# Patient Record
Sex: FEMALE | Race: OTHER | NOT HISPANIC OR LATINO
[De-identification: names, ages, dates, MRNs, and addresses within clinical notes are randomized per-mention and may not be internally consistent; named-entity substitution may affect disease eponyms.]

---

## 2021-05-10 ENCOUNTER — NON-APPOINTMENT (OUTPATIENT)
Age: 71
End: 2021-05-10

## 2021-05-10 ENCOUNTER — APPOINTMENT (OUTPATIENT)
Dept: OPHTHALMOLOGY | Facility: CLINIC | Age: 71
End: 2021-05-10
Payer: MEDICAID

## 2021-05-10 PROCEDURE — 92020 GONIOSCOPY: CPT

## 2021-05-10 PROCEDURE — 92250 FUNDUS PHOTOGRAPHY W/I&R: CPT

## 2021-05-10 PROCEDURE — 92136 OPHTHALMIC BIOMETRY: CPT

## 2021-05-10 PROCEDURE — 92004 COMPRE OPH EXAM NEW PT 1/>: CPT

## 2021-05-10 PROCEDURE — 99072 ADDL SUPL MATRL&STAF TM PHE: CPT

## 2021-06-10 ENCOUNTER — NON-APPOINTMENT (OUTPATIENT)
Age: 71
End: 2021-06-10

## 2021-06-10 ENCOUNTER — APPOINTMENT (OUTPATIENT)
Dept: OPHTHALMOLOGY | Facility: CLINIC | Age: 71
End: 2021-06-10
Payer: MEDICAID

## 2021-06-10 PROBLEM — Z00.00 ENCOUNTER FOR PREVENTIVE HEALTH EXAMINATION: Status: ACTIVE | Noted: 2021-06-10

## 2021-06-10 PROCEDURE — 76511 OPH US DX QUAN A-SCAN ONLY: CPT | Mod: LT

## 2021-06-10 PROCEDURE — 92012 INTRM OPH EXAM EST PATIENT: CPT

## 2021-06-25 DIAGNOSIS — Z01.818 ENCOUNTER FOR OTHER PREPROCEDURAL EXAMINATION: ICD-10-CM

## 2021-06-26 ENCOUNTER — APPOINTMENT (OUTPATIENT)
Dept: DISASTER EMERGENCY | Facility: CLINIC | Age: 71
End: 2021-06-26

## 2021-06-28 ENCOUNTER — TRANSCRIPTION ENCOUNTER (OUTPATIENT)
Age: 71
End: 2021-06-28

## 2021-06-29 ENCOUNTER — APPOINTMENT (OUTPATIENT)
Dept: OPHTHALMOLOGY | Facility: AMBULATORY SURGERY CENTER | Age: 71
End: 2021-06-29

## 2021-06-29 ENCOUNTER — OUTPATIENT (OUTPATIENT)
Dept: OUTPATIENT SERVICES | Facility: HOSPITAL | Age: 71
LOS: 1 days | Discharge: HOME | End: 2021-06-29
Payer: MEDICAID

## 2021-06-29 ENCOUNTER — NON-APPOINTMENT (OUTPATIENT)
Age: 71
End: 2021-06-29

## 2021-06-29 LAB
GLUCOSE BLDC GLUCOMTR-MCNC: 255 MG/DL — HIGH (ref 70–99)
GLUCOSE BLDC GLUCOMTR-MCNC: 301 MG/DL — HIGH (ref 70–99)
GLUCOSE BLDC GLUCOMTR-MCNC: 311 MG/DL — HIGH (ref 70–99)
GLUCOSE BLDC GLUCOMTR-MCNC: 320 MG/DL — HIGH (ref 70–99)

## 2021-06-29 PROCEDURE — 66850 REMOVAL OF LENS MATERIAL: CPT | Mod: RT

## 2021-06-30 ENCOUNTER — NON-APPOINTMENT (OUTPATIENT)
Age: 71
End: 2021-06-30

## 2021-06-30 ENCOUNTER — APPOINTMENT (OUTPATIENT)
Dept: OPHTHALMOLOGY | Facility: CLINIC | Age: 71
End: 2021-06-30
Payer: MEDICAID

## 2021-06-30 PROCEDURE — 99024 POSTOP FOLLOW-UP VISIT: CPT

## 2021-07-01 ENCOUNTER — APPOINTMENT (OUTPATIENT)
Dept: OPHTHALMOLOGY | Facility: CLINIC | Age: 71
End: 2021-07-01

## 2021-07-06 ENCOUNTER — APPOINTMENT (OUTPATIENT)
Dept: OPHTHALMOLOGY | Facility: CLINIC | Age: 71
End: 2021-07-06
Payer: MEDICAID

## 2021-07-06 ENCOUNTER — NON-APPOINTMENT (OUTPATIENT)
Age: 71
End: 2021-07-06

## 2021-07-06 PROCEDURE — 92134 CPTRZ OPH DX IMG PST SGM RTA: CPT

## 2021-07-06 PROCEDURE — 99024 POSTOP FOLLOW-UP VISIT: CPT

## 2021-07-06 PROCEDURE — 92136 OPHTHALMIC BIOMETRY: CPT | Mod: 26,RT

## 2021-07-07 ENCOUNTER — APPOINTMENT (OUTPATIENT)
Dept: OPHTHALMOLOGY | Facility: CLINIC | Age: 71
End: 2021-07-07
Payer: MEDICAID

## 2021-07-07 ENCOUNTER — NON-APPOINTMENT (OUTPATIENT)
Age: 71
End: 2021-07-07

## 2021-07-07 ENCOUNTER — TRANSCRIPTION ENCOUNTER (OUTPATIENT)
Age: 71
End: 2021-07-07

## 2021-07-07 PROCEDURE — 99024 POSTOP FOLLOW-UP VISIT: CPT

## 2021-07-08 ENCOUNTER — APPOINTMENT (OUTPATIENT)
Dept: OPHTHALMOLOGY | Facility: AMBULATORY SURGERY CENTER | Age: 71
End: 2021-07-08

## 2021-07-08 ENCOUNTER — OUTPATIENT (OUTPATIENT)
Dept: OUTPATIENT SERVICES | Facility: HOSPITAL | Age: 71
LOS: 1 days | Discharge: HOME | End: 2021-07-08
Payer: MEDICAID

## 2021-07-08 LAB
GLUCOSE BLDC GLUCOMTR-MCNC: 129 MG/DL — HIGH (ref 70–99)
GLUCOSE BLDC GLUCOMTR-MCNC: 161 MG/DL — HIGH (ref 70–99)

## 2021-07-08 PROCEDURE — 66985 INSERT LENS PROSTHESIS: CPT | Mod: RT,79

## 2021-07-08 PROCEDURE — 66850 REMOVAL OF LENS MATERIAL: CPT | Mod: 79,RT

## 2021-07-09 ENCOUNTER — NON-APPOINTMENT (OUTPATIENT)
Age: 71
End: 2021-07-09

## 2021-07-09 ENCOUNTER — APPOINTMENT (OUTPATIENT)
Dept: OPHTHALMOLOGY | Facility: CLINIC | Age: 71
End: 2021-07-09
Payer: MEDICAID

## 2021-07-09 PROCEDURE — 99024 POSTOP FOLLOW-UP VISIT: CPT

## 2021-07-16 ENCOUNTER — APPOINTMENT (OUTPATIENT)
Dept: OPHTHALMOLOGY | Facility: CLINIC | Age: 71
End: 2021-07-16
Payer: MEDICAID

## 2021-07-16 ENCOUNTER — NON-APPOINTMENT (OUTPATIENT)
Age: 71
End: 2021-07-16

## 2021-07-16 PROCEDURE — 99024 POSTOP FOLLOW-UP VISIT: CPT

## 2021-07-29 ENCOUNTER — APPOINTMENT (OUTPATIENT)
Dept: OPHTHALMOLOGY | Facility: CLINIC | Age: 71
End: 2021-07-29

## 2021-07-30 ENCOUNTER — NON-APPOINTMENT (OUTPATIENT)
Age: 71
End: 2021-07-30

## 2021-07-30 ENCOUNTER — APPOINTMENT (OUTPATIENT)
Dept: OPHTHALMOLOGY | Facility: CLINIC | Age: 71
End: 2021-07-30
Payer: MEDICAID

## 2021-07-30 PROCEDURE — 99024 POSTOP FOLLOW-UP VISIT: CPT

## 2021-08-27 ENCOUNTER — APPOINTMENT (OUTPATIENT)
Dept: OPHTHALMOLOGY | Facility: CLINIC | Age: 71
End: 2021-08-27
Payer: MEDICAID

## 2021-08-27 ENCOUNTER — NON-APPOINTMENT (OUTPATIENT)
Age: 71
End: 2021-08-27

## 2021-08-27 PROCEDURE — 99024 POSTOP FOLLOW-UP VISIT: CPT

## 2021-09-17 ENCOUNTER — APPOINTMENT (OUTPATIENT)
Dept: OPHTHALMOLOGY | Facility: CLINIC | Age: 71
End: 2021-09-17
Payer: MEDICAID

## 2021-09-17 ENCOUNTER — NON-APPOINTMENT (OUTPATIENT)
Age: 71
End: 2021-09-17

## 2021-09-17 PROCEDURE — 92134 CPTRZ OPH DX IMG PST SGM RTA: CPT

## 2021-09-17 PROCEDURE — 92014 COMPRE OPH EXAM EST PT 1/>: CPT | Mod: 24

## 2021-10-08 ENCOUNTER — APPOINTMENT (OUTPATIENT)
Dept: OPHTHALMOLOGY | Facility: CLINIC | Age: 71
End: 2021-10-08
Payer: MEDICAID

## 2021-10-08 ENCOUNTER — NON-APPOINTMENT (OUTPATIENT)
Age: 71
End: 2021-10-08

## 2021-10-08 PROCEDURE — 92014 COMPRE OPH EXAM EST PT 1/>: CPT

## 2021-10-08 PROCEDURE — 92134 CPTRZ OPH DX IMG PST SGM RTA: CPT

## 2021-12-27 ENCOUNTER — INPATIENT (INPATIENT)
Facility: HOSPITAL | Age: 71
LOS: 13 days | Discharge: NURSING, REHAB OR LONG-TERM CARE | DRG: 71 | End: 2022-01-10
Attending: HOSPITALIST | Admitting: HOSPITALIST
Payer: MEDICAID

## 2021-12-27 VITALS
DIASTOLIC BLOOD PRESSURE: 86 MMHG | OXYGEN SATURATION: 96 % | SYSTOLIC BLOOD PRESSURE: 156 MMHG | HEART RATE: 108 BPM | TEMPERATURE: 98 F | RESPIRATION RATE: 18 BRPM

## 2021-12-27 PROCEDURE — 99285 EMERGENCY DEPT VISIT HI MDM: CPT

## 2021-12-27 RX ORDER — SODIUM CHLORIDE 9 MG/ML
1000 INJECTION INTRAMUSCULAR; INTRAVENOUS; SUBCUTANEOUS ONCE
Refills: 0 | Status: COMPLETED | OUTPATIENT
Start: 2021-12-27 | End: 2021-12-27

## 2021-12-27 RX ORDER — INSULIN HUMAN 100 [IU]/ML
8 INJECTION, SOLUTION SUBCUTANEOUS ONCE
Refills: 0 | Status: COMPLETED | OUTPATIENT
Start: 2021-12-27 | End: 2021-12-27

## 2021-12-27 NOTE — ED PROVIDER NOTE - NSICDXPASTMEDICALHX_GEN_ALL_CORE_FT
PAST MEDICAL HISTORY:  CVA (cerebrovascular accident)     Dementia     DM (diabetes mellitus)     HTN (hypertension)

## 2021-12-27 NOTE — ED ADULT TRIAGE NOTE - CHIEF COMPLAINT QUOTE
AMS abusive to staff and other patients after being transferred from [place] to [facility] pt  with history of Dementia and Alzheimers

## 2021-12-27 NOTE — ED ADULT NURSE NOTE - CHIEF COMPLAINT QUOTE
AMS abusive to staff and other patients after being transferred from [place] to [facility] pt  with history of Dementia and Alzheimers

## 2021-12-27 NOTE — ED PROVIDER NOTE - OBJECTIVE STATEMENT
70 y/o female h/o dementia, CVA, HTN, DM, brought in by 70 y/o female h/o dementia, CVA, HTN, DM, brought in for [facility] for being restless and combative, and hitting and kicking nursing home staff. Pt is alert and confused. Pt is unsure why she is here.

## 2021-12-27 NOTE — ED PROVIDER NOTE - CLINICAL SUMMARY MEDICAL DECISION MAKING FREE TEXT BOX
Pt with worsening dementia. Will check basic blood work and urine. Will give insulin and admit for nursing home.

## 2021-12-27 NOTE — ED ADULT NURSE NOTE - INTERVENTIONS DEFINITIONS
Instruct patient to call for assistance/Room bathroom lighting operational/Non-slip footwear when patient is off stretcher/Physically safe environment: no spills, clutter or unnecessary equipment/Stretcher in lowest position, wheels locked, appropriate side rails in place/Monitor gait and stability/Provide visual clues: red socks

## 2021-12-27 NOTE — ED PROVIDER NOTE - QRS
Patient lying in bed with no complaints of pain or distress noted.  Monitors applied.  VSS.  Yellow non- skid socks placed on patient. Fall risk review with patient.  Procedure and recovery process explained to patient and all questions answered.  Patient verbalized understanding.  Will continue to monitor.     86

## 2021-12-28 DIAGNOSIS — R07.89 OTHER CHEST PAIN: ICD-10-CM

## 2021-12-28 DIAGNOSIS — R29.6 REPEATED FALLS: ICD-10-CM

## 2021-12-28 DIAGNOSIS — R29.6 REPEATED FALLS: Chronic | ICD-10-CM

## 2021-12-28 DIAGNOSIS — Z86.73 PERSONAL HISTORY OF TRANSIENT ISCHEMIC ATTACK (TIA), AND CEREBRAL INFARCTION WITHOUT RESIDUAL DEFICITS: ICD-10-CM

## 2021-12-28 DIAGNOSIS — E11.9 TYPE 2 DIABETES MELLITUS WITHOUT COMPLICATIONS: ICD-10-CM

## 2021-12-28 DIAGNOSIS — G30.9 ALZHEIMER'S DISEASE, UNSPECIFIED: ICD-10-CM

## 2021-12-28 DIAGNOSIS — F32.9 MAJOR DEPRESSIVE DISORDER, SINGLE EPISODE, UNSPECIFIED: ICD-10-CM

## 2021-12-28 DIAGNOSIS — R45.1 RESTLESSNESS AND AGITATION: ICD-10-CM

## 2021-12-28 DIAGNOSIS — G93.40 ENCEPHALOPATHY, UNSPECIFIED: ICD-10-CM

## 2021-12-28 DIAGNOSIS — Z29.9 ENCOUNTER FOR PROPHYLACTIC MEASURES, UNSPECIFIED: ICD-10-CM

## 2021-12-28 DIAGNOSIS — I10 ESSENTIAL (PRIMARY) HYPERTENSION: ICD-10-CM

## 2021-12-28 DIAGNOSIS — Z02.9 ENCOUNTER FOR ADMINISTRATIVE EXAMINATIONS, UNSPECIFIED: ICD-10-CM

## 2021-12-28 DIAGNOSIS — R06.02 SHORTNESS OF BREATH: ICD-10-CM

## 2021-12-28 LAB
ALBUMIN SERPL ELPH-MCNC: 3.7 G/DL — SIGNIFICANT CHANGE UP (ref 3.5–5)
ALP SERPL-CCNC: 105 U/L — SIGNIFICANT CHANGE UP (ref 40–120)
ALT FLD-CCNC: 29 U/L DA — SIGNIFICANT CHANGE UP (ref 10–60)
ANION GAP SERPL CALC-SCNC: 8 MMOL/L — SIGNIFICANT CHANGE UP (ref 5–17)
APPEARANCE UR: CLEAR — SIGNIFICANT CHANGE UP
AST SERPL-CCNC: 12 U/L — SIGNIFICANT CHANGE UP (ref 10–40)
BACTERIA # UR AUTO: ABNORMAL /HPF
BASOPHILS # BLD AUTO: 0.05 K/UL — SIGNIFICANT CHANGE UP (ref 0–0.2)
BASOPHILS NFR BLD AUTO: 0.4 % — SIGNIFICANT CHANGE UP (ref 0–2)
BILIRUB SERPL-MCNC: 0.7 MG/DL — SIGNIFICANT CHANGE UP (ref 0.2–1.2)
BILIRUB UR-MCNC: NEGATIVE — SIGNIFICANT CHANGE UP
BUN SERPL-MCNC: 23 MG/DL — HIGH (ref 7–18)
CALCIUM SERPL-MCNC: 9.6 MG/DL — SIGNIFICANT CHANGE UP (ref 8.4–10.5)
CHLORIDE SERPL-SCNC: 101 MMOL/L — SIGNIFICANT CHANGE UP (ref 96–108)
CO2 SERPL-SCNC: 28 MMOL/L — SIGNIFICANT CHANGE UP (ref 22–31)
COLOR SPEC: YELLOW — SIGNIFICANT CHANGE UP
CREAT SERPL-MCNC: 0.86 MG/DL — SIGNIFICANT CHANGE UP (ref 0.5–1.3)
DIFF PNL FLD: ABNORMAL
EOSINOPHIL # BLD AUTO: 0.04 K/UL — SIGNIFICANT CHANGE UP (ref 0–0.5)
EOSINOPHIL NFR BLD AUTO: 0.3 % — SIGNIFICANT CHANGE UP (ref 0–6)
EPI CELLS # UR: SIGNIFICANT CHANGE UP /HPF
GLUCOSE BLDC GLUCOMTR-MCNC: 119 MG/DL — HIGH (ref 70–99)
GLUCOSE BLDC GLUCOMTR-MCNC: 128 MG/DL — HIGH (ref 70–99)
GLUCOSE BLDC GLUCOMTR-MCNC: 191 MG/DL — HIGH (ref 70–99)
GLUCOSE BLDC GLUCOMTR-MCNC: 250 MG/DL — HIGH (ref 70–99)
GLUCOSE SERPL-MCNC: 431 MG/DL — HIGH (ref 70–99)
GLUCOSE UR QL: 1000 MG/DL
HCT VFR BLD CALC: 41.9 % — SIGNIFICANT CHANGE UP (ref 34.5–45)
HGB BLD-MCNC: 14.3 G/DL — SIGNIFICANT CHANGE UP (ref 11.5–15.5)
IMM GRANULOCYTES NFR BLD AUTO: 0.3 % — SIGNIFICANT CHANGE UP (ref 0–1.5)
KETONES UR-MCNC: ABNORMAL
LACTATE SERPL-SCNC: 2.8 MMOL/L — HIGH (ref 0.7–2)
LEUKOCYTE ESTERASE UR-ACNC: NEGATIVE — SIGNIFICANT CHANGE UP
LYMPHOCYTES # BLD AUTO: 17 % — SIGNIFICANT CHANGE UP (ref 13–44)
LYMPHOCYTES # BLD AUTO: 2.03 K/UL — SIGNIFICANT CHANGE UP (ref 1–3.3)
MCHC RBC-ENTMCNC: 29.5 PG — SIGNIFICANT CHANGE UP (ref 27–34)
MCHC RBC-ENTMCNC: 34.1 GM/DL — SIGNIFICANT CHANGE UP (ref 32–36)
MCV RBC AUTO: 86.6 FL — SIGNIFICANT CHANGE UP (ref 80–100)
MONOCYTES # BLD AUTO: 0.73 K/UL — SIGNIFICANT CHANGE UP (ref 0–0.9)
MONOCYTES NFR BLD AUTO: 6.1 % — SIGNIFICANT CHANGE UP (ref 2–14)
NEUTROPHILS # BLD AUTO: 9.04 K/UL — HIGH (ref 1.8–7.4)
NEUTROPHILS NFR BLD AUTO: 75.9 % — SIGNIFICANT CHANGE UP (ref 43–77)
NITRITE UR-MCNC: NEGATIVE — SIGNIFICANT CHANGE UP
NRBC # BLD: 0 /100 WBCS — SIGNIFICANT CHANGE UP (ref 0–0)
PH UR: 6 — SIGNIFICANT CHANGE UP (ref 5–8)
PLATELET # BLD AUTO: 289 K/UL — SIGNIFICANT CHANGE UP (ref 150–400)
POTASSIUM SERPL-MCNC: 4.2 MMOL/L — SIGNIFICANT CHANGE UP (ref 3.5–5.3)
POTASSIUM SERPL-SCNC: 4.2 MMOL/L — SIGNIFICANT CHANGE UP (ref 3.5–5.3)
PROCALCITONIN SERPL-MCNC: 0.06 NG/ML — SIGNIFICANT CHANGE UP (ref 0.02–0.1)
PROT SERPL-MCNC: 8 G/DL — SIGNIFICANT CHANGE UP (ref 6–8.3)
PROT UR-MCNC: 30 MG/DL
RBC # BLD: 4.84 M/UL — SIGNIFICANT CHANGE UP (ref 3.8–5.2)
RBC # FLD: 14.8 % — HIGH (ref 10.3–14.5)
RBC CASTS # UR COMP ASSIST: SIGNIFICANT CHANGE UP /HPF (ref 0–2)
SARS-COV-2 RNA SPEC QL NAA+PROBE: SIGNIFICANT CHANGE UP
SODIUM SERPL-SCNC: 137 MMOL/L — SIGNIFICANT CHANGE UP (ref 135–145)
SP GR SPEC: 1.01 — SIGNIFICANT CHANGE UP (ref 1.01–1.02)
TROPONIN I, HIGH SENSITIVITY RESULT: 19.5 NG/L — SIGNIFICANT CHANGE UP
TROPONIN I, HIGH SENSITIVITY RESULT: 21.8 NG/L — SIGNIFICANT CHANGE UP
UROBILINOGEN FLD QL: NEGATIVE — SIGNIFICANT CHANGE UP
WBC # BLD: 11.93 K/UL — HIGH (ref 3.8–10.5)
WBC # FLD AUTO: 11.93 K/UL — HIGH (ref 3.8–10.5)
WBC UR QL: SIGNIFICANT CHANGE UP /HPF (ref 0–5)

## 2021-12-28 PROCEDURE — 99222 1ST HOSP IP/OBS MODERATE 55: CPT

## 2021-12-28 PROCEDURE — 71045 X-RAY EXAM CHEST 1 VIEW: CPT | Mod: 26

## 2021-12-28 RX ORDER — AZITHROMYCIN 500 MG/1
500 TABLET, FILM COATED ORAL EVERY 24 HOURS
Refills: 0 | Status: DISCONTINUED | OUTPATIENT
Start: 2021-12-29 | End: 2021-12-29

## 2021-12-28 RX ORDER — AZITHROMYCIN 500 MG/1
500 TABLET, FILM COATED ORAL ONCE
Refills: 0 | Status: COMPLETED | OUTPATIENT
Start: 2021-12-28 | End: 2021-12-28

## 2021-12-28 RX ORDER — INSULIN HUMAN 100 [IU]/ML
8 INJECTION, SOLUTION SUBCUTANEOUS ONCE
Refills: 0 | Status: COMPLETED | OUTPATIENT
Start: 2021-12-28 | End: 2021-12-28

## 2021-12-28 RX ORDER — ASCORBIC ACID 60 MG
500 TABLET,CHEWABLE ORAL DAILY
Refills: 0 | Status: DISCONTINUED | OUTPATIENT
Start: 2021-12-28 | End: 2022-01-10

## 2021-12-28 RX ORDER — INSULIN LISPRO 100/ML
VIAL (ML) SUBCUTANEOUS AT BEDTIME
Refills: 0 | Status: DISCONTINUED | OUTPATIENT
Start: 2021-12-28 | End: 2021-12-31

## 2021-12-28 RX ORDER — INSULIN LISPRO 100/ML
10 VIAL (ML) SUBCUTANEOUS
Refills: 0 | Status: DISCONTINUED | OUTPATIENT
Start: 2021-12-28 | End: 2022-01-01

## 2021-12-28 RX ORDER — ENOXAPARIN SODIUM 100 MG/ML
40 INJECTION SUBCUTANEOUS DAILY
Refills: 0 | Status: DISCONTINUED | OUTPATIENT
Start: 2021-12-28 | End: 2022-01-10

## 2021-12-28 RX ORDER — INFLUENZA VIRUS VACCINE 15; 15; 15; 15 UG/.5ML; UG/.5ML; UG/.5ML; UG/.5ML
0.7 SUSPENSION INTRAMUSCULAR ONCE
Refills: 0 | Status: DISCONTINUED | OUTPATIENT
Start: 2021-12-28 | End: 2022-01-10

## 2021-12-28 RX ORDER — MEMANTINE HYDROCHLORIDE 10 MG/1
5 TABLET ORAL DAILY
Refills: 0 | Status: DISCONTINUED | OUTPATIENT
Start: 2021-12-28 | End: 2022-01-10

## 2021-12-28 RX ORDER — ASPIRIN/CALCIUM CARB/MAGNESIUM 324 MG
81 TABLET ORAL DAILY
Refills: 0 | Status: DISCONTINUED | OUTPATIENT
Start: 2021-12-28 | End: 2022-01-10

## 2021-12-28 RX ORDER — INSULIN GLARGINE 100 [IU]/ML
30 INJECTION, SOLUTION SUBCUTANEOUS AT BEDTIME
Refills: 0 | Status: DISCONTINUED | OUTPATIENT
Start: 2021-12-28 | End: 2021-12-31

## 2021-12-28 RX ORDER — AZITHROMYCIN 500 MG/1
TABLET, FILM COATED ORAL
Refills: 0 | Status: DISCONTINUED | OUTPATIENT
Start: 2021-12-28 | End: 2021-12-29

## 2021-12-28 RX ORDER — LOSARTAN POTASSIUM 100 MG/1
100 TABLET, FILM COATED ORAL DAILY
Refills: 0 | Status: DISCONTINUED | OUTPATIENT
Start: 2021-12-28 | End: 2022-01-10

## 2021-12-28 RX ORDER — CEFTRIAXONE 500 MG/1
1000 INJECTION, POWDER, FOR SOLUTION INTRAMUSCULAR; INTRAVENOUS EVERY 24 HOURS
Refills: 0 | Status: DISCONTINUED | OUTPATIENT
Start: 2021-12-28 | End: 2021-12-29

## 2021-12-28 RX ORDER — INSULIN LISPRO 100/ML
VIAL (ML) SUBCUTANEOUS
Refills: 0 | Status: DISCONTINUED | OUTPATIENT
Start: 2021-12-28 | End: 2022-01-10

## 2021-12-28 RX ORDER — DORZOLAMIDE HYDROCHLORIDE TIMOLOL MALEATE 20; 5 MG/ML; MG/ML
1 SOLUTION/ DROPS OPHTHALMIC
Refills: 0 | Status: DISCONTINUED | OUTPATIENT
Start: 2021-12-28 | End: 2022-01-10

## 2021-12-28 RX ORDER — PREDNISOLONE SODIUM PHOSPHATE 1 %
1 DROPS OPHTHALMIC (EYE)
Refills: 0 | Status: DISCONTINUED | OUTPATIENT
Start: 2021-12-28 | End: 2022-01-10

## 2021-12-28 RX ORDER — METOPROLOL TARTRATE 50 MG
100 TABLET ORAL DAILY
Refills: 0 | Status: DISCONTINUED | OUTPATIENT
Start: 2021-12-28 | End: 2022-01-10

## 2021-12-28 RX ORDER — TRAZODONE HCL 50 MG
50 TABLET ORAL AT BEDTIME
Refills: 0 | Status: DISCONTINUED | OUTPATIENT
Start: 2021-12-28 | End: 2022-01-10

## 2021-12-28 RX ORDER — SODIUM CHLORIDE 9 MG/ML
1000 INJECTION INTRAMUSCULAR; INTRAVENOUS; SUBCUTANEOUS ONCE
Refills: 0 | Status: COMPLETED | OUTPATIENT
Start: 2021-12-28 | End: 2021-12-28

## 2021-12-28 RX ADMIN — INSULIN HUMAN 8 UNIT(S): 100 INJECTION, SOLUTION SUBCUTANEOUS at 00:02

## 2021-12-28 RX ADMIN — Medication 1 DROP(S): at 06:15

## 2021-12-28 RX ADMIN — SODIUM CHLORIDE 1000 MILLILITER(S): 9 INJECTION INTRAMUSCULAR; INTRAVENOUS; SUBCUTANEOUS at 00:02

## 2021-12-28 RX ADMIN — Medication 10 UNIT(S): at 13:47

## 2021-12-28 RX ADMIN — Medication 81 MILLIGRAM(S): at 13:41

## 2021-12-28 RX ADMIN — ENOXAPARIN SODIUM 40 MILLIGRAM(S): 100 INJECTION SUBCUTANEOUS at 13:44

## 2021-12-28 RX ADMIN — DORZOLAMIDE HYDROCHLORIDE TIMOLOL MALEATE 1 DROP(S): 20; 5 SOLUTION/ DROPS OPHTHALMIC at 17:49

## 2021-12-28 RX ADMIN — Medication 10 UNIT(S): at 17:49

## 2021-12-28 RX ADMIN — Medication 500 MILLIGRAM(S): at 13:42

## 2021-12-28 RX ADMIN — LOSARTAN POTASSIUM 100 MILLIGRAM(S): 100 TABLET, FILM COATED ORAL at 06:15

## 2021-12-28 RX ADMIN — Medication 2: at 13:47

## 2021-12-28 RX ADMIN — DORZOLAMIDE HYDROCHLORIDE TIMOLOL MALEATE 1 DROP(S): 20; 5 SOLUTION/ DROPS OPHTHALMIC at 06:15

## 2021-12-28 RX ADMIN — Medication 1 DROP(S): at 21:42

## 2021-12-28 RX ADMIN — INSULIN GLARGINE 30 UNIT(S): 100 INJECTION, SOLUTION SUBCUTANEOUS at 21:42

## 2021-12-28 RX ADMIN — SODIUM CHLORIDE 1000 MILLILITER(S): 9 INJECTION INTRAMUSCULAR; INTRAVENOUS; SUBCUTANEOUS at 03:38

## 2021-12-28 RX ADMIN — MEMANTINE HYDROCHLORIDE 5 MILLIGRAM(S): 10 TABLET ORAL at 13:42

## 2021-12-28 RX ADMIN — AZITHROMYCIN 255 MILLIGRAM(S): 500 TABLET, FILM COATED ORAL at 08:33

## 2021-12-28 RX ADMIN — INSULIN HUMAN 8 UNIT(S): 100 INJECTION, SOLUTION SUBCUTANEOUS at 03:38

## 2021-12-28 RX ADMIN — Medication 50 MILLIGRAM(S): at 21:41

## 2021-12-28 RX ADMIN — CEFTRIAXONE 100 MILLIGRAM(S): 500 INJECTION, POWDER, FOR SOLUTION INTRAMUSCULAR; INTRAVENOUS at 08:33

## 2021-12-28 RX ADMIN — Medication 100 MILLIGRAM(S): at 06:15

## 2021-12-28 NOTE — H&P ADULT - PROBLEM SELECTOR PLAN 4
- SW consulted for placement - Chronic as per the patient  - EKG = sinus tachycardia, left deviation, ST changes at V6, T wave inversion in I and aVL, no active ischemia  - f/u Troponin  -f/u echo - Chronic as per the patient  - EKG = sinus tachycardia, left deviation, ST changes at V6, T wave inversion in I and aVL, no active ischemia  - f/u Troponin  - f/u echo - Chronic as per the patient  - EKG = sinus tachycardia, left deviation, ST changes at V6, T wave inversion in I and aVL, no active ischemia  - f/u Troponin

## 2021-12-28 NOTE — H&P ADULT - ATTENDING COMMENTS
Patient is a 71 year old female with a PMH of HTN, DM, h/o CVA, Dementia who was BIBEMS due to altered mental status.  (Monegasque  ID: [id number])  Patient is awake, alert and conversant though, as per Monegasque , cannot ?understand Monegasque .  Therefore, a significant amount of information was obtained from medical records.  As per ED Attending note "brought in for [facility] for being restless and combative, and hitting and kicking nursing home staff. Pt is alert and confused. Pt is unsure why she is here."    T(C): 36.2 (12-28-21 @ 06:13), Max: 36.8 (12-28-21 @ 00:55)  T(F): 97.2 (12-28-21 @ 06:13), Max: 98.3 (12-28-21 @ 00:55)  HR: 103 (12-28-21 @ 06:13) (103 - 115)  BP: 153/85 (12-28-21 @ 06:13) (150/85 - 156/86)  RR: 18 (12-28-21 @ 06:13) (18 - 18)  SpO2: 95% (12-28-21 @ 06:13) (95% - 96%)  Wt(kg): --    P/E: As above MAR    A/P:    Aggressive Behavior possibly due to worsening Dementia vs uncontrolled DM:  -COVID= Not Detected  -Will order CT Head   -Will obtain CXR  -Given patient's history of dementia, will likely need to ask Neuro to evaluate patient for further recommendations    DM:  -As above  -Hemoglobin A1c with AM labs  -Blood Glucose Monitoring ACHS  -Regular Insulin Sliding Scale ACHS  -Carb Controlled, Heart Healthy, Low Sodium diet as tolerated    HTN:  -Will resume patient's home medication  -Vital Signs Q4H    GI/DVT PPx:  -Intermittent Compression Stockings  -Pepcid

## 2021-12-28 NOTE — CHART NOTE - NSCHARTNOTEFT_GEN_A_CORE
REVIEW OF SYSTEMS: Guyanese  Al #[id number]  CONSTITUTIONAL: No fever, chills  ENMT:  No difficulty hearing  NECK: No pain or stiffness  RESPIRATORY: No cough, SOB  CARDIOVASCULAR: No chest pain, palpitations  GASTROINTESTINAL: No abdominal pain. No nausea, vomiting, or diarrhea  GENITOURINARY: No dysuria  NEUROLOGICAL: No HA  SKIN: No itching, burning, rashes, or lesions   MUSCULOSKELETAL: No joint pain or swelling; No muscle, back, or extremity pain    T(C): 36.5 (21 @ 13:00), Max: 36.8 (21 @ 00:55)  HR: 103 (21 @ 13:00) (103 - 115)  BP: 168/98 (21 @ 13:00) (150/85 - 168/98)  RR: 19 (21 @ 13:00) (18 - 20)  SpO2: 97% (21 @ 13:00) (95% - 97%)  Wt(kg): --Vital Signs Last 24 Hrs  T(C): 36.5 (28 Dec 2021 13:00), Max: 36.8 (28 Dec 2021 00:55)  T(F): 97.7 (28 Dec 2021 13:00), Max: 98.3 (28 Dec 2021 00:55)  HR: 103 (28 Dec 2021 13:00) (103 - 115)  BP: 168/98 (28 Dec 2021 13:00) (150/85 - 168/98)  BP(mean): --  RR: 19 (28 Dec 2021 13:00) (18 - 20)  SpO2: 97% (28 Dec 2021 13:00) (95% - 97%)    MEDICATIONS  (STANDING):  ascorbic acid 500 milliGRAM(s) Oral daily  aspirin  chewable 81 milliGRAM(s) Oral daily  azithromycin  IVPB      cefTRIAXone   IVPB 1000 milliGRAM(s) IV Intermittent every 24 hours  dorzolamide 2%/timolol 0.5% Ophthalmic Solution 1 Drop(s) Both EYES two times a day  enoxaparin Injectable 40 milliGRAM(s) SubCutaneous daily  insulin glargine Injectable (LANTUS) 30 Unit(s) SubCutaneous at bedtime  insulin lispro (ADMELOG) corrective regimen sliding scale   SubCutaneous three times a day before meals  insulin lispro (ADMELOG) corrective regimen sliding scale   SubCutaneous at bedtime  insulin lispro Injectable (ADMELOG) 10 Unit(s) SubCutaneous three times a day before meals  losartan 100 milliGRAM(s) Oral daily  memantine 5 milliGRAM(s) Oral daily  metoprolol succinate  milliGRAM(s) Oral daily  prednisoLONE acetate 1% Suspension 1 Drop(s) Both EYES two times a day  traZODone 50 milliGRAM(s) Oral at bedtime    MEDICATIONS  (PRN):    FOCUSED PHYSICAL EXAM:  GENERAL: NAD  EYES: clear conjunctiva  ENMT: Moist mucous membranes  NECK: Supple, No JVD  CHEST/LUNG: Clear to auscultation bilaterally; No rales, rhonchi, wheezing, or rubs  HEART: S1, S2, Regular rate and rhythm  ABDOMEN: Soft, Nontender, Nondistended; Bowel sounds present  NEURO: Alert & Oriented X3 (name, place, time)  EXTREMITIES: No LE edema, no calf tenderness  SKIN: No rashes or lesions    LABS:                        14.3   11.93 )-----------( 289      ( 28 Dec 2021 00:13 )             41.9         137  |  101  |  23<H>  ----------------------------<  431<H>  4.2   |  28  |  0.86    Ca    9.6      28 Dec 2021 00:13    TPro  8.0  /  Alb  3.7  /  TBili  0.7  /  DBili  x   /  AST  12  /  ALT  29  /  AlkPhos  105        CAPILLARY BLOOD GLUCOSE  128 (28 Dec 2021 07:21)      POCT Blood Glucose.: 250 mg/dL (28 Dec 2021 13:04)  POCT Blood Glucose.: 128 mg/dL (28 Dec 2021 07:21)  POCT Blood Glucose.: 407 mg/dL (27 Dec 2021 23:56)  POCT Blood Glucose.: 443 mg/dL (27 Dec 2021 22:32)        Urinalysis Basic - ( 28 Dec 2021 00:55 )    Color: Yellow / Appearance: Clear / S.010 / pH: x  Gluc: x / Ketone: Moderate  / Bili: Negative / Urobili: Negative   Blood: x / Protein: 30 mg/dL / Nitrite: Negative   Leuk Esterase: Negative / RBC: 0-2 /HPF / WBC 0-2 /HPF   Sq Epi: x / Non Sq Epi: Few /HPF / Bacteria: Trace /HPF      ASSESSMENT:      Called [facility], spoke with nurse who reports that patient was transferred from [facility], when patient arrived into her room, pt started fighting with her roommate and afterwards attempted to elope the building, so nursing staff called EMS to bring to hospital for medical evaluation for AMS. Nurse also reports that [facility] gave pt's bed away to another patient and will need a new placement.     Pt is aox3, afebrile, behaving calmly, denies any pain.     PLAN: REVIEW OF SYSTEMS: Martiniquais  Al #[id number]  CONSTITUTIONAL: No fever, chills  ENMT:  No difficulty hearing  NECK: No pain or stiffness  RESPIRATORY: No cough, SOB  CARDIOVASCULAR: No chest pain, palpitations  GASTROINTESTINAL: No abdominal pain. No nausea, vomiting, or diarrhea  GENITOURINARY: No dysuria  NEUROLOGICAL: No HA  SKIN: No itching, burning, rashes, or lesions   MUSCULOSKELETAL: No joint pain or swelling; No muscle, back, or extremity pain    T(C): 36.5 (21 @ 13:00), Max: 36.8 (21 @ 00:55)  HR: 103 (21 @ 13:00) (103 - 115)  BP: 168/98 (21 @ 13:00) (150/85 - 168/98)  RR: 19 (21 @ 13:00) (18 - 20)  SpO2: 97% (21 @ 13:00) (95% - 97%)  Wt(kg): --Vital Signs Last 24 Hrs  T(C): 36.5 (28 Dec 2021 13:00), Max: 36.8 (28 Dec 2021 00:55)  T(F): 97.7 (28 Dec 2021 13:00), Max: 98.3 (28 Dec 2021 00:55)  HR: 103 (28 Dec 2021 13:00) (103 - 115)  BP: 168/98 (28 Dec 2021 13:00) (150/85 - 168/98)  BP(mean): --  RR: 19 (28 Dec 2021 13:00) (18 - 20)  SpO2: 97% (28 Dec 2021 13:00) (95% - 97%)    MEDICATIONS  (STANDING):  ascorbic acid 500 milliGRAM(s) Oral daily  aspirin  chewable 81 milliGRAM(s) Oral daily  azithromycin  IVPB      cefTRIAXone   IVPB 1000 milliGRAM(s) IV Intermittent every 24 hours  dorzolamide 2%/timolol 0.5% Ophthalmic Solution 1 Drop(s) Both EYES two times a day  enoxaparin Injectable 40 milliGRAM(s) SubCutaneous daily  insulin glargine Injectable (LANTUS) 30 Unit(s) SubCutaneous at bedtime  insulin lispro (ADMELOG) corrective regimen sliding scale   SubCutaneous three times a day before meals  insulin lispro (ADMELOG) corrective regimen sliding scale   SubCutaneous at bedtime  insulin lispro Injectable (ADMELOG) 10 Unit(s) SubCutaneous three times a day before meals  losartan 100 milliGRAM(s) Oral daily  memantine 5 milliGRAM(s) Oral daily  metoprolol succinate  milliGRAM(s) Oral daily  prednisoLONE acetate 1% Suspension 1 Drop(s) Both EYES two times a day  traZODone 50 milliGRAM(s) Oral at bedtime    MEDICATIONS  (PRN):    FOCUSED PHYSICAL EXAM:  GENERAL: NAD  EYES: clear conjunctiva  ENMT: Moist mucous membranes  NECK: Supple, No JVD  CHEST/LUNG: Clear to auscultation bilaterally; No rales, rhonchi, wheezing, or rubs  HEART: S1, S2, Regular rate and rhythm  ABDOMEN: Soft, Nontender, Nondistended; Bowel sounds present  NEURO: Alert & Oriented X3 (name, place, time)  EXTREMITIES: No LE edema, no calf tenderness  SKIN: No rashes or lesions    LABS:                        14.3   11.93 )-----------( 289      ( 28 Dec 2021 00:13 )             41.9         137  |  101  |  23<H>  ----------------------------<  431<H>  4.2   |  28  |  0.86    Ca    9.6      28 Dec 2021 00:13    TPro  8.0  /  Alb  3.7  /  TBili  0.7  /  DBili  x   /  AST  12  /  ALT  29  /  AlkPhos  105        CAPILLARY BLOOD GLUCOSE  128 (28 Dec 2021 07:21)      POCT Blood Glucose.: 250 mg/dL (28 Dec 2021 13:04)  POCT Blood Glucose.: 128 mg/dL (28 Dec 2021 07:21)  POCT Blood Glucose.: 407 mg/dL (27 Dec 2021 23:56)  POCT Blood Glucose.: 443 mg/dL (27 Dec 2021 22:32)        Urinalysis Basic - ( 28 Dec 2021 00:55 )    Color: Yellow / Appearance: Clear / S.010 / pH: x  Gluc: x / Ketone: Moderate  / Bili: Negative / Urobili: Negative   Blood: x / Protein: 30 mg/dL / Nitrite: Negative   Leuk Esterase: Negative / RBC: 0-2 /HPF / WBC 0-2 /HPF   Sq Epi: x / Non Sq Epi: Few /HPF / Bacteria: Trace /HPF      ASSESSMENT:      70 y/o female, Martiniquais speaking, from [facility], with PMH of dementia, HTN, CVA, DM c/o agitated behavior. Admitted to medicine for further management of altered mental status 2/2 worsening dementia vs infection.    Called [facility], spoke with nurse who reports that patient was transferred from [facility] yesterday, when patient arrived into her room, pt started fighting with her roommate and afterwards attempted to elope the building, so nursing staff called EMS to bring to hospital for medical evaluation for AMS. Nurse also reports that [facility] gave pt's bed away to another patient and will need a new placement.     Pt is aox3, afebrile, behaving calmly, denies any pain.   Troponin negative x2    PLAN:  -continue memantine, trazodone  -continue antibiotics  -f/u chest xray  -f/u blood cultures  -monitor CBC  -monitor behavior for any agitation  -pending social work for new placement

## 2021-12-28 NOTE — H&P ADULT - PROBLEM SELECTOR PLAN 2
- 2 days history of SOB  - saturating well on room air  - COVID negative  - CXR = - 2 days history of SOB  - saturating well on room air  - S/p COVID infection 11/16/21. Takes ASA, Vit C. Incentive spirometer  - Currently COVID negative  - CXR = - 2 days history of SOB  - saturating well on room air  - S/p COVID infection 11/16/21. Takes ASA, Vit C. Incentive spirometer  - Currently COVID negative  - f/u CXR increased forgetfulness   most likely 2/2 dementia worsening vs infectious etiology vs metabolic  f/y xray chest , procal   for now will start azithro , ceftriaxone   sliding scale for dm  monitor bp - increased forgetfulness   - most likely 2/2 dementia worsening vs infectious etiology vs metabolic  - f/y xray chest , procal   - for now will start azithro, ceftriaxone   - f/u BCx  - monitor bp

## 2021-12-28 NOTE — H&P ADULT - PROBLEM SELECTOR PLAN 1
- Home meds Memantine  - C/w home meds  - Fighting with staff at NH but does not remember  - SW consulted for placement - Fighting with staff at NH but does not remember. Likely dementia + sundowning  - Home meds Memantine  - C/w home meds  - Neuro consult in AM, then please consider psych consult.  - SW consulted for placement

## 2021-12-28 NOTE — H&P ADULT - ASSESSMENT
Patient is a 71 y.o. F, ambulates independently, from [facility], w/ PMHx of Alzheimer's' dementia, hx of CVA, HTN, DM, MDD, and recurrent falls, was sent for being restless and combative, and hitting and kicking nursing home staff. Admitted for dementia and agitation.

## 2021-12-28 NOTE — PATIENT PROFILE ADULT - FALL HARM RISK - HARM RISK INTERVENTIONS
Assistance with ambulation/Assistance OOB with selected safe patient handling equipment/Communicate Risk of Fall with Harm to all staff/Discuss with provider need for PT consult/Monitor for mental status changes/Monitor gait and stability/Move patient closer to nurses' station/Reinforce activity limits and safety measures with patient and family/Reorient to person, place and time as needed/Tailored Fall Risk Interventions/Toileting schedule using arm’s reach rule for commode and bathroom/Use of alarms - bed, chair and/or voice tab/Visual Cue: Yellow wristband and red socks/Bed in lowest position, wheels locked, appropriate side rails in place/Call bell, personal items and telephone in reach/Instruct patient to call for assistance before getting out of bed or chair/Non-slip footwear when patient is out of bed/[place] to call system/Physically safe environment - no spills, clutter or unnecessary equipment/Purposeful Proactive Rounding/Room/bathroom lighting operational, light cord in reach

## 2021-12-28 NOTE — PATIENT PROFILE ADULT - CONTRAINDICATIONS & PRECAUTIONS (SELECT ALL THAT APPLY)
Physical Exam  Mallampati: I  Neck ROM: Full  cardiovascular exam normal  pulmonary exam normal  abdominal exam normal      Legend: C=Chipped  M=Missing  L=Loose    Anesthesia Plan  ASA Status: 3  Anesthesia Type: General  Induction: Intravenous  Preferred Airway Type: ETT  Reviewed: Lab Results, Nursing Notes, Pre-Induction Reassessment, NPO Status, Medications, Past Med History, EKG, Consultations, Patient Summary, Allergies and Problem List  The proposed anesthetic plan, including its risks and benefits, have been discussed with the Patient - along with the risks and benefits of alternatives.  Questions were encouraged and answered and the patient and/or representative agrees to proceed.  Blood Products: Not Anticipated      Anesthesia ROS/Med Hx        Pulmonary Review:    Pt. positive for pneumonia  Pt. positive for sleep apnea - CPAP  Pt. positive for COPD - Severity: moderate  Pt. positive for asthmaThe patient is a current smoker.     Neuro/Psych Review:    Pt. positive for CVA    Cardiovascular Review:    Pt. positive for CHF  Pt. positive for past MI  Pt. positive for CAD  Pt. positive for hypertension - well controlled  Pt. positive for hyperlipidemia    GI/HEPATIC/RENAL Review:    Pt. positive for GERD - well controlled    End/Other Review:    Pt. positive for diabetes - type 2    
none...

## 2021-12-28 NOTE — H&P ADULT - HISTORY OF PRESENT ILLNESS
Patient is a 71 y.o. F, ambulates independently, from [facility], w/ PMHx of  h/o dementia, CVA, HTN, DM, was sent for being restless and combative, and hitting and kicking nursing home staff. Pt is alert and oriented x2. Patient is confused and does not remember fighting with the nursing home staff.    Patient endorses chest heaviness that has been there chronically. She complains of SOB that started 2 days ago. Patient denies other symptoms. Patient is a 71 y.o. F, ambulates independently, from [facility], w/ PMHx of Alzheimer's' dementia, hx of CVA, HTN, DM, MDD, and recurrent falls, was sent for being restless and combative, and hitting and kicking nursing home staff. Pt is alert and oriented x2. Patient is confused and does not remember fighting with the nursing home staff.  Patient endorses chest heaviness that has been there chronically. She complains of SOB that started 2 days ago. Patient denies any other symptoms.  During the interview, the patient keeps asking to charge her cell phone. Although she is keep being told that there is no , patient forgets and asks again and again.

## 2021-12-28 NOTE — H&P ADULT - NSICDXPASTMEDICALHX_GEN_ALL_CORE_FT
PAST MEDICAL HISTORY:  Alzheimer's dementia     CVA (cerebrovascular accident)     Dementia     DM (diabetes mellitus)     HTN (hypertension)     Major depressive disorder

## 2021-12-28 NOTE — H&P ADULT - PROBLEM SELECTOR PLAN 7
- Home meds Losartan, metoprolol  - c/w home meds - Home lantus 30U, admelog 10U tid.  - c/w home meds  - HbA1c 12.1 in 11/23/2021  - Insulin ss  - FS achs

## 2021-12-28 NOTE — H&P ADULT - PROBLEM SELECTOR PLAN 6
- Home lantus 30U, admelog 10U tid.  - c/w home meds  - HbA1c 12.1 in 11/23/2021  - Insulin ss  - FS achs - Home med Trazadone  - C/w home med

## 2021-12-28 NOTE — H&P ADULT - NSHPPHYSICALEXAM_GEN_ALL_CORE
GENERAL: NAD, well-groomed, well-developed  HEAD:  Atraumatic, Normocephalic  EYES: EOMI, PERRLA, conjunctiva and sclera clear  ENMT: No tonsillar erythema, exudates, or enlargement; Moist mucous membranes, Good dentition, No lesions  NECK: Supple, normal appearance, No JVD; Normal thyroid; Trachea midline  NERVOUS SYSTEM:  Alert & Oriented X3,  Motor Strength 5/5 B/L upper and lower extremities, sensation intact  CHEST/LUNG: Lungs clear to auscultation bilaterally, No rales, rhonchi, wheezing   HEART: Regular rate and rhythm; No murmurs, rubs, or gallops  ABDOMEN: Soft, Nontender, Nondistended; Bowel sounds present  EXTREMITIES:  2+ Peripheral Pulses, No clubbing, cyanosis, or edema  LYMPH: No lymphadenopathy noted  SKIN: No rashes or lesions;  Good capillary refill GENERAL: NAD  HEAD:  Atraumatic, Normocephalic  EYES: EOMI, PERRLA, conjunctiva and sclera clear  ENMT: No tonsillar erythema, exudates, or enlargement; Moist mucous membranes, Good dentition, No lesions  NECK: Supple, normal appearance, No JVD; Normal thyroid; Trachea midline  NERVOUS SYSTEM:  Alert & Oriented X2,  Motor Strength 5/5 B/L upper and lower extremities, sensation intact  CHEST/LUNG: Lungs clear to auscultation bilaterally, No rales, rhonchi, wheezing   HEART: Regular rate and rhythm; No murmurs, rubs, or gallops  ABDOMEN: Soft, Nontender, Nondistended; Bowel sounds present  EXTREMITIES:  2+ Peripheral Pulses, No clubbing, cyanosis, or edema  LYMPH: No lymphadenopathy noted  SKIN: Dry, scaly bilateral LEs;  Good capillary refill

## 2021-12-28 NOTE — H&P ADULT - NSHPREVIEWOFSYSTEMS_GEN_ALL_CORE
CONSTITUTIONAL: No fever, weight loss, or fatigue  EYES: No eye pain, visual disturbances, or discharge  ENT:  No difficulty hearing, tinnitus, vertigo; No sinus or throat pain  NECK: No pain or stiffness  RESPIRATORY: No cough, wheezing, chills or hemoptysis;  CARDIOVASCULAR: No chest pain, palpitations, passing out, dizziness, or leg swelling  GASTROINTESTINAL: No abdominal or epigastric pain. No nausea, vomiting, or hematemesis; No diarrhea or constipation. No melena or hematochezia.  GENITOURINARY: No dysuria, frequency, hematuria, or incontinence  NEUROLOGICAL: No headaches, memory loss, loss of strength, numbness, or tremors  SKIN: No itching, burning, rashes, or lesions   LYMPH Nodes: No enlarged glands  ENDOCRINE: No heat or cold intolerance; No hair loss  MUSCULOSKELETAL: No joint pain or swelling; No muscle, back, No extremity pain  PSYCHIATRIC: No depression, anxiety, mood swings, or difficulty sleeping  HEME/LYMPH: No easy bruising, or bleeding gums  ALLERGY AND IMMUNOLOGIC: No hives or eczema

## 2021-12-28 NOTE — H&P ADULT - PROBLEM SELECTOR PLAN 3
- chronic  -   - EKG = sinus tachycardia, left deviation, ST changes at V6, T wave inversion in I and aVL, no active ischemia - Chronic as per the patient  - EKG = sinus tachycardia, left deviation, ST changes at V6, T wave inversion in I and aVL, no active ischemia  - f/u Troponin - Chronic as per the patient  - EKG = sinus tachycardia, left deviation, ST changes at V6, T wave inversion in I and aVL, no active ischemia  - f/u Troponin  -f/u echo - 2 days history of SOB  - saturating well on room air  - S/p COVID infection 11/16/21. Takes ASA, Vit C. Incentive spirometer  - Currently COVID negative  - f/u CXR

## 2021-12-29 ENCOUNTER — TRANSCRIPTION ENCOUNTER (OUTPATIENT)
Age: 71
End: 2021-12-29

## 2021-12-29 LAB
ANION GAP SERPL CALC-SCNC: 8 MMOL/L — SIGNIFICANT CHANGE UP (ref 5–17)
BUN SERPL-MCNC: 25 MG/DL — HIGH (ref 7–18)
CALCIUM SERPL-MCNC: 8.9 MG/DL — SIGNIFICANT CHANGE UP (ref 8.4–10.5)
CHLORIDE SERPL-SCNC: 105 MMOL/L — SIGNIFICANT CHANGE UP (ref 96–108)
CO2 SERPL-SCNC: 27 MMOL/L — SIGNIFICANT CHANGE UP (ref 22–31)
CREAT SERPL-MCNC: 0.97 MG/DL — SIGNIFICANT CHANGE UP (ref 0.5–1.3)
CULTURE RESULTS: SIGNIFICANT CHANGE UP
GLUCOSE BLDC GLUCOMTR-MCNC: 197 MG/DL — HIGH (ref 70–99)
GLUCOSE BLDC GLUCOMTR-MCNC: 213 MG/DL — HIGH (ref 70–99)
GLUCOSE BLDC GLUCOMTR-MCNC: 231 MG/DL — HIGH (ref 70–99)
GLUCOSE BLDC GLUCOMTR-MCNC: 255 MG/DL — HIGH (ref 70–99)
GLUCOSE BLDC GLUCOMTR-MCNC: 278 MG/DL — HIGH (ref 70–99)
GLUCOSE SERPL-MCNC: 199 MG/DL — HIGH (ref 70–99)
HCT VFR BLD CALC: 37.8 % — SIGNIFICANT CHANGE UP (ref 34.5–45)
HCV AB S/CO SERPL IA: 0.12 S/CO — SIGNIFICANT CHANGE UP (ref 0–0.99)
HCV AB SERPL-IMP: SIGNIFICANT CHANGE UP
HGB BLD-MCNC: 12.7 G/DL — SIGNIFICANT CHANGE UP (ref 11.5–15.5)
MAGNESIUM SERPL-MCNC: 2 MG/DL — SIGNIFICANT CHANGE UP (ref 1.6–2.6)
MCHC RBC-ENTMCNC: 29.5 PG — SIGNIFICANT CHANGE UP (ref 27–34)
MCHC RBC-ENTMCNC: 33.6 GM/DL — SIGNIFICANT CHANGE UP (ref 32–36)
MCV RBC AUTO: 87.9 FL — SIGNIFICANT CHANGE UP (ref 80–100)
NRBC # BLD: 0 /100 WBCS — SIGNIFICANT CHANGE UP (ref 0–0)
PHOSPHATE SERPL-MCNC: 4.5 MG/DL — SIGNIFICANT CHANGE UP (ref 2.5–4.5)
PLATELET # BLD AUTO: 252 K/UL — SIGNIFICANT CHANGE UP (ref 150–400)
POTASSIUM SERPL-MCNC: 3.6 MMOL/L — SIGNIFICANT CHANGE UP (ref 3.5–5.3)
POTASSIUM SERPL-SCNC: 3.6 MMOL/L — SIGNIFICANT CHANGE UP (ref 3.5–5.3)
RBC # BLD: 4.3 M/UL — SIGNIFICANT CHANGE UP (ref 3.8–5.2)
RBC # FLD: 15.4 % — HIGH (ref 10.3–14.5)
SODIUM SERPL-SCNC: 140 MMOL/L — SIGNIFICANT CHANGE UP (ref 135–145)
SPECIMEN SOURCE: SIGNIFICANT CHANGE UP
WBC # BLD: 9.36 K/UL — SIGNIFICANT CHANGE UP (ref 3.8–10.5)
WBC # FLD AUTO: 9.36 K/UL — SIGNIFICANT CHANGE UP (ref 3.8–10.5)

## 2021-12-29 PROCEDURE — 99232 SBSQ HOSP IP/OBS MODERATE 35: CPT | Mod: GC

## 2021-12-29 RX ADMIN — Medication 100 MILLIGRAM(S): at 05:36

## 2021-12-29 RX ADMIN — Medication 1 DROP(S): at 17:03

## 2021-12-29 RX ADMIN — Medication 1 DROP(S): at 05:36

## 2021-12-29 RX ADMIN — DORZOLAMIDE HYDROCHLORIDE TIMOLOL MALEATE 1 DROP(S): 20; 5 SOLUTION/ DROPS OPHTHALMIC at 05:35

## 2021-12-29 RX ADMIN — Medication 3: at 16:59

## 2021-12-29 RX ADMIN — DORZOLAMIDE HYDROCHLORIDE TIMOLOL MALEATE 1 DROP(S): 20; 5 SOLUTION/ DROPS OPHTHALMIC at 17:03

## 2021-12-29 RX ADMIN — Medication 50 MILLIGRAM(S): at 22:00

## 2021-12-29 RX ADMIN — Medication 2: at 08:25

## 2021-12-29 RX ADMIN — LOSARTAN POTASSIUM 100 MILLIGRAM(S): 100 TABLET, FILM COATED ORAL at 05:36

## 2021-12-29 RX ADMIN — CEFTRIAXONE 100 MILLIGRAM(S): 500 INJECTION, POWDER, FOR SOLUTION INTRAMUSCULAR; INTRAVENOUS at 05:35

## 2021-12-29 RX ADMIN — Medication 3: at 13:27

## 2021-12-29 RX ADMIN — INSULIN GLARGINE 30 UNIT(S): 100 INJECTION, SOLUTION SUBCUTANEOUS at 22:00

## 2021-12-29 RX ADMIN — Medication 10 UNIT(S): at 08:26

## 2021-12-29 RX ADMIN — AZITHROMYCIN 255 MILLIGRAM(S): 500 TABLET, FILM COATED ORAL at 06:19

## 2021-12-29 RX ADMIN — MEMANTINE HYDROCHLORIDE 5 MILLIGRAM(S): 10 TABLET ORAL at 16:58

## 2021-12-29 RX ADMIN — Medication 10 UNIT(S): at 13:23

## 2021-12-29 RX ADMIN — Medication 500 MILLIGRAM(S): at 13:45

## 2021-12-29 RX ADMIN — ENOXAPARIN SODIUM 40 MILLIGRAM(S): 100 INJECTION SUBCUTANEOUS at 13:41

## 2021-12-29 RX ADMIN — Medication 81 MILLIGRAM(S): at 13:45

## 2021-12-29 RX ADMIN — Medication 10 UNIT(S): at 16:59

## 2021-12-29 NOTE — PROGRESS NOTE ADULT - ASSESSMENT
Patient is a 71 y.o. F, ambulates independently, from [facility], w/ PMHx of Alzheimer's' dementia, hx of CVA, HTN, DM, MDD, and recurrent falls, was sent for being restless and combative, and hitting and kicking nursing home staff. Admitted for dementia and agitation.

## 2021-12-29 NOTE — PROGRESS NOTE ADULT - PROBLEM SELECTOR PLAN 4
- Chronic as per the patient  - EKG = sinus tachycardia, left deviation, ST changes at V6, T wave inversion in I and aVL, no active ischemia  - f/u Troponin - Chronic as per the patient  - EKG = sinus tachycardia, left deviation, ST changes at V6, T wave inversion in I and aVL, no active ischemia  - Troponin - neg

## 2021-12-29 NOTE — PROGRESS NOTE ADULT - PROBLEM SELECTOR PLAN 5
- SW consulted for placement - SW consulted for placement  - Pt recommend [facility]  - medically stable and awaiting discharge

## 2021-12-29 NOTE — DISCHARGE NOTE PROVIDER - NSDCMRMEDTOKEN_GEN_ALL_CORE_FT
Admelog SoloStar 100 units/mL injectable solution: injectable 3 times a day (before meals)  Admelog SoloStar 100 units/mL injectable solution: 10 unit(s) injectable 3 times a day (before meals)  ascorbic acid 500 mg/mL injectable solution: 5 gram(s) injectable every 7 days  aspirin 81 mg oral tablet, chewable: 1 tab(s) orally once a day  Basaglar KwikPen 100 units/mL subcutaneous solution: 30 unit(s) subcutaneous once a day (at bedtime)  dorzolamide-timolol 2.23%-0.68% ophthalmic solution: 1 drop(s) to each affected eye 2 times a day  losartan 100 mg oral tablet: 1 tab(s) orally once a day  memantine 5 mg oral tablet: 1 tab(s) orally once a day  Metoprolol Succinate  mg oral tablet, extended release: 1 tab(s) orally once a day  prednisoLONE acetate 1% ophthalmic suspension: 1 drop(s) to each affected eye 2 times a day  traZODone 50 mg oral tablet: 1 tab(s) orally once a day (at bedtime)   Admelog SoloStar 100 units/mL injectable solution: 10 unit(s) injectable 3 times a day (before meals)  Admelog SoloStar 100 units/mL injectable solution: injectable 3 times a day (before meals)  ascorbic acid 500 mg/mL injectable solution: 5 gram(s) injectable every 7 days  aspirin 81 mg oral tablet, chewable: 1 tab(s) orally once a day  Basaglar KwikPen 100 units/mL subcutaneous solution: 30 unit(s) subcutaneous once a day (at bedtime)  dorzolamide-timolol 2.23%-0.68% ophthalmic solution: 1 drop(s) to each affected eye 2 times a day  losartan 100 mg oral tablet: 1 tab(s) orally once a day  memantine 5 mg oral tablet: 1 tab(s) orally once a day  Metoprolol Succinate  mg oral tablet, extended release: 1 tab(s) orally once a day  prednisoLONE acetate 1% ophthalmic suspension: 1 drop(s) to each affected eye 2 times a day  traZODone 50 mg oral tablet: 1 tab(s) orally once a day (at bedtime)   Admelog SoloStar 100 units/mL injectable solution: 10 unit(s) injectable 3 times a day (before meals)  Admelog SoloStar 100 units/mL injectable solution: injectable 3 times a day (before meals)  ascorbic acid 500 mg/mL injectable solution: 5 gram(s) injectable every 7 days  aspirin 81 mg oral tablet, chewable: 1 tab(s) orally once a day  Basaglar KwikPen 100 units/mL subcutaneous solution: 30 unit(s) subcutaneous once a day (at bedtime)  dorzolamide-timolol 2.23%-0.68% ophthalmic solution: 1 drop(s) to each affected eye 2 times a day  hydroCHLOROthiazide 12.5 mg oral capsule: 1 cap(s) orally once a day  losartan 100 mg oral tablet: 1 tab(s) orally once a day  memantine 5 mg oral tablet: 1 tab(s) orally once a day  Metoprolol Succinate  mg oral tablet, extended release: 1 tab(s) orally once a day  prednisoLONE acetate 1% ophthalmic suspension: 1 drop(s) to each affected eye 2 times a day  traZODone 50 mg oral tablet: 1 tab(s) orally once a day (at bedtime)

## 2021-12-29 NOTE — PROGRESS NOTE ADULT - PROBLEM SELECTOR PLAN 1
- Fighting with staff at NH but does not remember. Likely dementia + sundowning  - Home meds Memantine  - C/w home meds  - Neuro consult in AM, then please consider psych consult.  - SW consulted for placement - Fighting with staff at NH but does not remember. Likely dementia + sundowning  - Home meds Memantine  - C/w home meds  - Neuro consult in AM, then please consider psych consult.  - SW consulted for placement  - currently stable and cooperative

## 2021-12-29 NOTE — PROGRESS NOTE ADULT - SUBJECTIVE AND OBJECTIVE BOX
PGY-1 Progress Note discussed with attending    PAGER #: [[phone]] TILL 5:00 PM  PLEASE CONTACT ON CALL TEAM:  - On Call Team (Please refer to Roxy) FROM 5:00 PM - 8:30PM  - Nightfloat Team FROM 8:30 -7:30 AM    CHIEF COMPLAINT & BRIEF HOSPITAL COURSE:      INTERVAL HPI/OVERNIGHT EVENTS:       REVIEW OF SYSTEMS:  CONSTITUTIONAL: No fever, weight loss, or fatigue  RESPIRATORY: No cough, wheezing, chills or hemoptysis; No shortness of breath  CARDIOVASCULAR: No chest pain, palpitations, dizziness, or leg swelling  GASTROINTESTINAL: No abdominal pain. No nausea, vomiting, or hematemesis; No diarrhea or constipation. No melena or hematochezia.  GENITOURINARY: No dysuria or hematuria, urinary frequency  NEUROLOGICAL: No headaches, memory loss, loss of strength, numbness, or tremors  SKIN: No itching, burning, rashes, or lesions     MEDICATIONS  (STANDING):  ascorbic acid 500 milliGRAM(s) Oral daily  aspirin  chewable 81 milliGRAM(s) Oral daily  azithromycin  IVPB      azithromycin  IVPB 500 milliGRAM(s) IV Intermittent every 24 hours  cefTRIAXone   IVPB 1000 milliGRAM(s) IV Intermittent every 24 hours  dorzolamide 2%/timolol 0.5% Ophthalmic Solution 1 Drop(s) Both EYES two times a day  enoxaparin Injectable 40 milliGRAM(s) SubCutaneous daily  influenza  Vaccine (HIGH DOSE) 0.7 milliLiter(s) IntraMuscular once  insulin glargine Injectable (LANTUS) 30 Unit(s) SubCutaneous at bedtime  insulin lispro (ADMELOG) corrective regimen sliding scale   SubCutaneous three times a day before meals  insulin lispro (ADMELOG) corrective regimen sliding scale   SubCutaneous at bedtime  insulin lispro Injectable (ADMELOG) 10 Unit(s) SubCutaneous three times a day before meals  losartan 100 milliGRAM(s) Oral daily  memantine 5 milliGRAM(s) Oral daily  metoprolol succinate  milliGRAM(s) Oral daily  prednisoLONE acetate 1% Suspension 1 Drop(s) Both EYES two times a day  traZODone 50 milliGRAM(s) Oral at bedtime    MEDICATIONS  (PRN):      Vital Signs Last 24 Hrs  T(C): 36.6 (29 Dec 2021 04:38), Max: 36.6 (29 Dec 2021 04:38)  T(F): 97.8 (29 Dec 2021 04:38), Max: 97.8 (29 Dec 2021 04:38)  HR: 91 (29 Dec 2021 04:38) (91 - 105)  BP: 130/77 (29 Dec 2021 04:38) (130/77 - 168/98)  BP(mean): --  RR: 19 (29 Dec 2021 04:38) (19 - 20)  SpO2: 96% (29 Dec 2021 04:38) (95% - 97%)    PHYSICAL EXAMINATION:  GENERAL: NAD, well built  HEAD:  Atraumatic, Normocephalic  EYES:  conjunctiva and sclera clear  NECK: Supple, No JVD, Normal thyroid  CHEST/LUNG: Clear to auscultation. Clear to percussion bilaterally; No rales, rhonchi, wheezing, or rubs  HEART: Regular rate and rhythm; No murmurs, rubs, or gallops  ABDOMEN: Soft, Nontender, Nondistended; Bowel sounds present, no pain or masses on palpation  NERVOUS SYSTEM:  Alert & Oriented X3  : voiding well  EXTREMITIES:  2+ Peripheral Pulses, No clubbing, cyanosis, or edema  SKIN: warm dry                          12.7   9.36  )-----------( 252      ( 29 Dec 2021 06:31 )             37.8     12-29    140  |  105  |  25<H>  ----------------------------<  199<H>  3.6   |  27  |  0.97    Ca    8.9      29 Dec 2021 09:33  Phos  4.5     12-29  Mg     2.0     12-29    TPro  8.0  /  Alb  3.7  /  TBili  0.7  /  DBili  x   /  AST  12  /  ALT  29  /  AlkPhos  105  12-28    LIVER FUNCTIONS - ( 28 Dec 2021 00:13 )  Alb: 3.7 g/dL / Pro: 8.0 g/dL / ALK PHOS: 105 U/L / ALT: 29 U/L DA / AST: 12 U/L / GGT: x                   I&O's Summary          CAPILLARY BLOOD GLUCOSE      RADIOLOGY & ADDITIONAL TESTS:                   PGY-1 Progress Note discussed with attending    PAGER #: [[phone]] TILL 5:00 PM  PLEASE CONTACT ON CALL TEAM:  - On Call Team (Please refer to Roxy) FROM 5:00 PM - 8:30PM  - Nightfloat Team FROM 8:30 -7:30 AM    CHIEF COMPLAINT & BRIEF HOSPITAL COURSE:    71 y.o. F, ambulates independently, from [facility], w/ PMHx of Alzheimer's' dementia, hx of CVA, HTN, DM, MDD, and recurrent falls, was sent for being restless and combative, and hitting and kicking nursing home staff. Pt is alert and oriented x2. Patient is confused and does not remember fighting with the nursing home staff. PT recommended [facility].    INTERVAL HPI/OVERNIGHT EVENTS:     Patient examined at bedside using a [place] , Yonatan ([id number]). No significant overnight events. Denies any complaints. Has noted R arm weakness due to a shoulder fracture a month ago and noted weakness/ imbalance on standing/ walking. We spoke to the patient's daughter, Ms. Ami Moise ([id number]) and provided her with updates. She is medically stable and awaiting discharge to [facility].    REVIEW OF SYSTEMS:  CONSTITUTIONAL: No fever, weight loss, or fatigue  RESPIRATORY: No cough, wheezing, chills or hemoptysis; No shortness of breath  CARDIOVASCULAR: No chest pain, palpitations, dizziness, or leg swelling  GASTROINTESTINAL: No abdominal pain. No nausea, vomiting, or hematemesis; No diarrhea or constipation. No melena or hematochezia.  GENITOURINARY: No dysuria or hematuria, urinary frequency  NEUROLOGICAL: No headaches, memory loss, loss of strength, numbness, or tremors  SKIN: No itching, burning, rashes, or lesions     MEDICATIONS  (STANDING):  ascorbic acid 500 milliGRAM(s) Oral daily  aspirin  chewable 81 milliGRAM(s) Oral daily  azithromycin  IVPB      azithromycin  IVPB 500 milliGRAM(s) IV Intermittent every 24 hours  cefTRIAXone   IVPB 1000 milliGRAM(s) IV Intermittent every 24 hours  dorzolamide 2%/timolol 0.5% Ophthalmic Solution 1 Drop(s) Both EYES two times a day  enoxaparin Injectable 40 milliGRAM(s) SubCutaneous daily  influenza  Vaccine (HIGH DOSE) 0.7 milliLiter(s) IntraMuscular once  insulin glargine Injectable (LANTUS) 30 Unit(s) SubCutaneous at bedtime  insulin lispro (ADMELOG) corrective regimen sliding scale   SubCutaneous three times a day before meals  insulin lispro (ADMELOG) corrective regimen sliding scale   SubCutaneous at bedtime  insulin lispro Injectable (ADMELOG) 10 Unit(s) SubCutaneous three times a day before meals  losartan 100 milliGRAM(s) Oral daily  memantine 5 milliGRAM(s) Oral daily  metoprolol succinate  milliGRAM(s) Oral daily  prednisoLONE acetate 1% Suspension 1 Drop(s) Both EYES two times a day  traZODone 50 milliGRAM(s) Oral at bedtime    MEDICATIONS  (PRN):      Vital Signs Last 24 Hrs  T(C): 36.6 (29 Dec 2021 04:38), Max: 36.6 (29 Dec 2021 04:38)  T(F): 97.8 (29 Dec 2021 04:38), Max: 97.8 (29 Dec 2021 04:38)  HR: 91 (29 Dec 2021 04:38) (91 - 105)  BP: 130/77 (29 Dec 2021 04:38) (130/77 - 168/98)  BP(mean): --  RR: 19 (29 Dec 2021 04:38) (19 - 20)  SpO2: 96% (29 Dec 2021 04:38) (95% - 97%)    PHYSICAL EXAMINATION:  GENERAL: NAD, well built  HEAD:  Atraumatic, Normocephalic  EYES:  conjunctiva and sclera clear  NECK: Supple, No JVD, Normal thyroid  CHEST/LUNG: Clear to auscultation. Clear to percussion bilaterally; No rales, rhonchi, wheezing, or rubs  HEART: Regular rate and rhythm; No murmurs, rubs, or gallops  ABDOMEN: Soft, Nontender, Nondistended; Bowel sounds present, no pain or masses on palpation  NERVOUS SYSTEM:  Alert & Oriented X3, motor strength 5/5 except on RUE (due to old shoulder fracture), intact sensation on all extremities, noted imbalance on standing/ ambulation  : voiding well  EXTREMITIES:  2+ Peripheral Pulses, No clubbing, cyanosis, or edema  SKIN: warm dry                          12.7   9.36  )-----------( 252      ( 29 Dec 2021 06:31 )             37.8     12-29    140  |  105  |  25<H>  ----------------------------<  199<H>  3.6   |  27  |  0.97    Ca    8.9      29 Dec 2021 09:33  Phos  4.5     12-29  Mg     2.0     12-29    TPro  8.0  /  Alb  3.7  /  TBili  0.7  /  DBili  x   /  AST  12  /  ALT  29  /  AlkPhos  105  12-28    LIVER FUNCTIONS - ( 28 Dec 2021 00:13 )  Alb: 3.7 g/dL / Pro: 8.0 g/dL / ALK PHOS: 105 U/L / ALT: 29 U/L DA / AST: 12 U/L / GGT: x                   I&O's Summary          CAPILLARY BLOOD GLUCOSE      RADIOLOGY & ADDITIONAL TESTS:

## 2021-12-29 NOTE — PROGRESS NOTE ADULT - PROBLEM SELECTOR PLAN 3
- 2 days history of SOB  - saturating well on room air  - S/p COVID infection 11/16/21. Takes ASA, Vit C. Incentive spirometer  - Currently COVID negative  - f/u CXR

## 2021-12-29 NOTE — DISCHARGE NOTE PROVIDER - NSDCCPCAREPLAN_GEN_ALL_CORE_FT
PRINCIPAL DISCHARGE DIAGNOSIS  Diagnosis: Agitation  Assessment and Plan of Treatment:       SECONDARY DISCHARGE DIAGNOSES  Diagnosis: Dementia  Assessment and Plan of Treatment:      PRINCIPAL DISCHARGE DIAGNOSIS  Diagnosis: Acute encephalopathy  Assessment and Plan of Treatment: You have history of dementia and had been displaying aggressive behavior in the [facility]. You were hitting and kicking the nursing staff. You were confused and do not remember what happened. You were brought to [facility]. You were admitted for acute encephalopathy. Infectious etiology was considered. You were given prophylactice dose of antibiotics Azithromycin and Rocephin. Chest Xray and Blood culture were negative. You never developed fever and your procalcitonin was negative. Metabolic cuases were ruled-out with normal electrolytes and blood glucose. Home medications for dementia (Memantine) was resumed. Throughout admission, you had been stable and cooperative. You were subsequently discharged to [facility].      SECONDARY DISCHARGE DIAGNOSES  Diagnosis: Dementia  Assessment and Plan of Treatment: You have history of dementia and had been displaying aggressive behavior in the [facility]. You were hitting and kicking the nursing staff. You were confused and do not remember what happened. You were brought to [facility]. You were admitted for acute encephalopathy. Home medications for dementia (Memantine) was resumed. Throughout admission, you had been stable and cooperative. You were subsequently discharged to [facility].    Diagnosis: SOB (shortness of breath)  Assessment and Plan of Treatment: You had shortness of breath 2 days prior to admission. You had a previous COVId infection last 11/16/21. You have been taking Aspirin, Vitamin C and doing incentive spirometry. Your COVID test was negative and you were saturating well on room air. Your shortness of breath resolved on its own.    Diagnosis: Chest heaviness  Assessment and Plan of Treatment: You have episodes of chest heaviness. On admission, EKG showed sinus tachycardia with ST changes but no active ischemia. Your troponins were negative. Your chest pain resolved spontaneously.    Diagnosis: HTN (hypertension)  Assessment and Plan of Treatment: You ar ehypertensive on home medications losartan and metoprolol. Your blood pressure was monitored.    Diagnosis: Type 2 diabetes mellitus  Assessment and Plan of Treatment:     Diagnosis: Major depressive disorder  Assessment and Plan of Treatment:      PRINCIPAL DISCHARGE DIAGNOSIS  Diagnosis: Acute encephalopathy  Assessment and Plan of Treatment: You have history of dementia and had been displaying aggressive behavior in the [facility]. You were hitting and kicking the nursing staff. You were confused and do not remember what happened. You were brought to [facility]. You were admitted for acute encephalopathy. Infectious etiology was considered. You were given prophylactice dose of antibiotics Azithromycin and Rocephin. Chest Xray and Blood culture were negative. You never developed fever and your procalcitonin was negative. Metabolic cuases were ruled-out with normal electrolytes and blood glucose. Home medications for dementia (Memantine) was resumed. Throughout admission, you had been stable and cooperative. You were subsequently discharged to [facility].      SECONDARY DISCHARGE DIAGNOSES  Diagnosis: Dementia  Assessment and Plan of Treatment: You have history of dementia and had been displaying aggressive behavior in the [facility]. You were hitting and kicking the nursing staff. You were confused and do not remember what happened. You were brought to [facility]. You were admitted for acute encephalopathy. Home medications for dementia (Memantine) was resumed. Throughout admission, you had been stable and cooperative. You were subsequently discharged to [facility].    Diagnosis: SOB (shortness of breath)  Assessment and Plan of Treatment: You had shortness of breath 2 days prior to admission. You had a previous COVId infection last 11/16/21. You have been taking Aspirin, Vitamin C and doing incentive spirometry. Your COVID test was negative and you were saturating well on room air. Your shortness of breath resolved on its own.    Diagnosis: Chest heaviness  Assessment and Plan of Treatment: You have episodes of chest heaviness. On admission, EKG showed sinus tachycardia with ST changes but no active ischemia. Your troponins were negative. Your chest pain resolved spontaneously.    Diagnosis: HTN (hypertension)  Assessment and Plan of Treatment: You ar ehypertensive on home medications losartan and metoprolol. Your blood pressure was monitored. We added Amlodipine [facility] blood presure medications were already optimized and it remains elevated. You have responded to the current regimen. Continue your home medications. Follow-up with your primary care doctor.    Diagnosis: Type 2 diabetes mellitus  Assessment and Plan of Treatment: You have Type 2 Diabetes Mellitus on Admelog 10 u premelas and Lantus 30 u at night. She was placed on insulin sliding scale for coverage and blood glucose was monitored. It was stable. Continue with your home insulin regimen. Follow-up with your Primary Care Doctor.    Diagnosis: Major depressive disorder  Assessment and Plan of Treatment: You have history of Major Depression on Trazodone. We continued your home medication.

## 2021-12-29 NOTE — DISCHARGE NOTE PROVIDER - HOSPITAL COURSE
71 y.o. F, ambulates independently, from [facility], w/ PMHx of Alzheimer's' dementia, hx of CVA, HTN, DM, MDD, and recurrent falls, was sent for being restless and combative, and hitting and kicking nursing home staff. Pt is alert and oriented x2. Patient is confused and does not remember fighting with the nursing home staff. PT recommended [facility] Patient is a 71 y.o. F, ambulates independently, from [facility], w/ PMHx of Alzheimer's' dementia, hx of CVA, HTN, DM, MDD, and recurrent falls, was sent for being restless and combative, and hitting and kicking nursing home staff. Pt is alert and oriented x2. Patient is confused and does not remember fighting with the nursing home staff. Patient endorses chest heaviness that has been there chronically. She complains of SOB that started 2 days ago. Patient denies any other symptoms. She was initially admitted for acute encephalopathy. Infection was ruled-out with a negative Chest Xray, blood culture and procalcitonin. He was initially given prophylactic dose of azithromycin and Rocephin but was discontinued. Patient also reported shortness of breath and chest heaviness. She had COVID infection last 11/16/21. COVID test was negative. EKG showed sinus tachycardia and ST changes. Troponins were negative. Blood pressure and glucose were monitored. Rest of home medication for HTN, DM, Dementia and MDD were continued. Blood pressure medications were adjusted with addition of Amlodipine to Losartan and Metoprolol. She was also started on insulin sliding scale for coverage. Patient was stable, improved and was subsequently discharged to [facility]. Patient is a 71 y.o. F, ambulates independently, from [facility], w/ PMHx of Alzheimer's' dementia, hx of CVA, HTN, DM, MDD, and recurrent falls, was sent for being restless and combative, and hitting and kicking nursing home staff. Pt is alert and oriented x2. Patient is confused and does not remember fighting with the nursing home staff. Patient endorses chest heaviness that has been there chronically. She complains of SOB that started 2 days ago. Patient denies any other symptoms. She was initially admitted for acute encephalopathy. Infection was ruled-out with a negative Chest Xray, blood culture and procalcitonin. He was initially given prophylactic dose of azithromycin and Rocephin but was discontinued. Patient also reported shortness of breath and chest heaviness. She had COVID infection last 11/16/21. COVID test was negative. EKG showed sinus tachycardia and ST changes. Troponins were negative. Blood pressure and glucose were monitored. Rest of home medication for HTN, DM, Dementia and MDD were continued. Blood pressure medications were adjusted with addition of Amlodipine to Losartan and Metoprolol. She was also started on insulin sliding scale for coverage. Patient was stable, and was subsequently discharged to [facility]..      >>>>>>>>>>>>>>>>>>>>>>>>>>>>>>>>>>>>>>>>>>>>>>>>>>>>>>>>>INCOMPLETE>>>>>>>>>>>>>>>>>>>>>>>>>>>>>>>>>>>>>>>>>>>> Patient is a 71 y.o. F, ambulates independently from [facility], w/ PMHx of Alzheimer's' dementia, hx of CVA, HTN, DM, MDD, and recurrent falls, was sent for being restless and combative, and hitting and kicking nursing home staff. Pt is alert and oriented x2. Patient is confused and does not remember fighting with the nursing home staff. Patient endorses chest heaviness that has been there chronically. She complains of SOB that started 2 days ago. Patient denies any other symptoms. She was initially admitted for acute encephalopathy. Infection was ruled-out with a negative Chest Xray, blood culture and procalcitonin. He was initially given prophylactic dose of azithromycin and Rocephin but was discontinued. Patient also reported shortness of breath and chest heaviness. She had COVID infection last 11/16/21. COVID test was negative. EKG showed sinus tachycardia and ST changes. Troponins were negative. Blood pressure and glucose were monitored. Rest of home medication for HTN, DM, Dementia and MDD were continued. Blood pressure medications were adjusted with addition of Amlodipine to Losartan and Metoprolol. She was also started on insulin sliding scale for coverage. Patient was stable, and was subsequently discharged to [facility]..      >>>>>>>>>>>>>>>>>>>>>>>>>>>>>>>>>>>>>>>>>>>>>>>>>>>>>>>>>INCOMPLETE>>>>>>>>>>>>>>>>>>>>>>>>>>>>>>>>>>>>>>>>>>>> Patient is a 71 y.o. F, ambulates independently from [facility], w/ PMHx of Alzheimer's' dementia, hx of CVA, HTN, DM, MDD, and recurrent falls, was sent for being restless and combative, and hitting and kicking nursing home staff. Pt is alert and oriented x2. Patient is confused and does not remember fighting with the nursing home staff. Patient endorses chest heaviness that has been there chronically. She complains of SOB that started 2 days ago. Patient denies any other symptoms. She was initially admitted for acute encephalopathy. Infection was ruled-out with a negative Chest Xray, blood culture and procalcitonin. He was initially given prophylactic dose of azithromycin and Rocephin but was discontinued. Patient also reported shortness of breath and chest heaviness. She had COVID infection last 11/16/21. COVID test was negative. EKG showed sinus tachycardia and ST changes. Troponins were negative.  Home medication for HTN, DM, Dementia and MDD were continued. Blood pressure medications were adjusted with addition of Amlodipine to Losartan and Metoprolol. She was also started on insulin sliding scale for coverage.   pt. is medically stable for discharge.  PT recommended [facility], family chose [facility].

## 2021-12-29 NOTE — DISCHARGE NOTE PROVIDER - ATTENDING DISCHARGE PHYSICAL EXAMINATION:
70 yo F with dementia, hx of CVA, HTN, DM2, MDD, and recurrent falls who presents after an episode of agitation in [facility]. Patient has been calm and cooperative at [facility]. Communicated with patient's bedside PCA in [place], currently denies any complaints.  Decrease lantus to 30 units on discharge, continue 10 unit with meals. Continuing other home meds. Patient is stable for dc to [facility]    On physical exam, patient is NAD, lungs CTAB, no c/c/e

## 2021-12-29 NOTE — PROGRESS NOTE ADULT - PROBLEM SELECTOR PLAN 7
- Home lantus 30U, admelog 10U tid.  - c/w home meds  - HbA1c 12.1 in 11/23/2021  - Insulin ss  - FS [facility]

## 2021-12-29 NOTE — PROGRESS NOTE ADULT - PROBLEM SELECTOR PLAN 2
- increased forgetfulness   - most likely 2/2 dementia worsening vs infectious etiology vs metabolic  - f/y xray chest , procal   - for now will start azithro, ceftriaxone   - f/u BCx  - monitor bp - increased forgetfulness   - most likely 2/2 dementia worsening vs infectious etiology vs metabolic  - f/u xray chest , procal   - for now will start azithro, ceftriaxone   - f/u BCx  - monitor bp  - currently stable and cooperative

## 2021-12-29 NOTE — PHYSICAL THERAPY INITIAL EVALUATION ADULT - LONG TERM MEMORY, REHAB EVAL
Referring provider: Roslyn Barber PA-C    Fidencio Boyd contact Arcenio Tierneyuriel.                          
WFL

## 2021-12-29 NOTE — DISCHARGE NOTE PROVIDER - NSFOLLOWUPCLINICS_GEN_ALL_ED_FT
[facility]  Internal Medicine  [address] [place] [address]  Phone: [phone]  Fax: [phone]

## 2021-12-29 NOTE — DISCHARGE NOTE PROVIDER - EXTENDED VTE YES NO FOR MLM ASPIRIN
Anesthesia Evaluation     Patient summary reviewed and Nursing notes reviewed   NPO Solid Status: > 8 hours  NPO Liquid Status: > 2 hours           Airway   Mallampati: II  TM distance: >3 FB  Neck ROM: full  no difficulty expected  Dental    (+) edentulous    Pulmonary - normal exam   Cardiovascular     Rhythm: regular  Rate: normal        Neuro/Psych  GI/Hepatic/Renal/Endo      Musculoskeletal (-) negative ROS    Abdominal    Substance History      OB/GYN          Other                        Anesthesia Plan    ASA 3     MAC     intravenous induction   Anesthetic plan and risks discussed with patient.    Plan discussed with CRNA.       ,

## 2021-12-30 LAB
GLUCOSE BLDC GLUCOMTR-MCNC: 127 MG/DL — HIGH (ref 70–99)
GLUCOSE BLDC GLUCOMTR-MCNC: 179 MG/DL — HIGH (ref 70–99)
GLUCOSE BLDC GLUCOMTR-MCNC: 183 MG/DL — HIGH (ref 70–99)
GLUCOSE BLDC GLUCOMTR-MCNC: 273 MG/DL — HIGH (ref 70–99)
GLUCOSE BLDC GLUCOMTR-MCNC: 295 MG/DL — HIGH (ref 70–99)
GLUCOSE BLDC GLUCOMTR-MCNC: 87 MG/DL — SIGNIFICANT CHANGE UP (ref 70–99)

## 2021-12-30 PROCEDURE — 99232 SBSQ HOSP IP/OBS MODERATE 35: CPT | Mod: GC

## 2021-12-30 PROCEDURE — 93010 ELECTROCARDIOGRAM REPORT: CPT

## 2021-12-30 RX ADMIN — Medication 10 UNIT(S): at 08:30

## 2021-12-30 RX ADMIN — Medication 1: at 17:27

## 2021-12-30 RX ADMIN — DORZOLAMIDE HYDROCHLORIDE TIMOLOL MALEATE 1 DROP(S): 20; 5 SOLUTION/ DROPS OPHTHALMIC at 05:33

## 2021-12-30 RX ADMIN — INSULIN GLARGINE 30 UNIT(S): 100 INJECTION, SOLUTION SUBCUTANEOUS at 22:26

## 2021-12-30 RX ADMIN — Medication 1 DROP(S): at 05:33

## 2021-12-30 RX ADMIN — MEMANTINE HYDROCHLORIDE 5 MILLIGRAM(S): 10 TABLET ORAL at 12:21

## 2021-12-30 RX ADMIN — Medication 500 MILLIGRAM(S): at 12:22

## 2021-12-30 RX ADMIN — DORZOLAMIDE HYDROCHLORIDE TIMOLOL MALEATE 1 DROP(S): 20; 5 SOLUTION/ DROPS OPHTHALMIC at 17:28

## 2021-12-30 RX ADMIN — Medication 81 MILLIGRAM(S): at 12:21

## 2021-12-30 RX ADMIN — Medication 10 UNIT(S): at 17:28

## 2021-12-30 RX ADMIN — Medication 50 MILLIGRAM(S): at 22:27

## 2021-12-30 RX ADMIN — Medication 1 DROP(S): at 17:28

## 2021-12-30 RX ADMIN — ENOXAPARIN SODIUM 40 MILLIGRAM(S): 100 INJECTION SUBCUTANEOUS at 12:22

## 2021-12-30 RX ADMIN — Medication 1: at 22:33

## 2021-12-30 NOTE — PROGRESS NOTE ADULT - PROBLEM SELECTOR PLAN 3
- 2 days history of SOB  - saturating well on room air  - S/p COVID infection 11/16/21. Takes ASA, Vit C. Incentive spirometer  - Currently COVID negative  - f/u CXR - 2 days history of SOB  - saturating well on room air  - S/p COVID infection 11/16/21. Takes ASA, Vit C. Incentive spirometer  - Currently COVID negative  - CXR - neg

## 2021-12-30 NOTE — PROGRESS NOTE ADULT - SUBJECTIVE AND OBJECTIVE BOX
PGY-1 Progress Note discussed with attending    PAGER #: [[phone]] TILL 5:00 PM  PLEASE CONTACT ON CALL TEAM:  - On Call Team (Please refer to Roxy) FROM 5:00 PM - 8:30PM  - Nightfloat Team FROM 8:30 -7:30 AM    CHIEF COMPLAINT & BRIEF HOSPITAL COURSE:      INTERVAL HPI/OVERNIGHT EVENTS:       REVIEW OF SYSTEMS:  CONSTITUTIONAL: No fever, weight loss, or fatigue  RESPIRATORY: No cough, wheezing, chills or hemoptysis; No shortness of breath  CARDIOVASCULAR: No chest pain, palpitations, dizziness, or leg swelling  GASTROINTESTINAL: No abdominal pain. No nausea, vomiting, or hematemesis; No diarrhea or constipation. No melena or hematochezia.  GENITOURINARY: No dysuria or hematuria, urinary frequency  NEUROLOGICAL: No headaches, memory loss, loss of strength, numbness, or tremors  SKIN: No itching, burning, rashes, or lesions     MEDICATIONS  (STANDING):  ascorbic acid 500 milliGRAM(s) Oral daily  aspirin  chewable 81 milliGRAM(s) Oral daily  dorzolamide 2%/timolol 0.5% Ophthalmic Solution 1 Drop(s) Both EYES two times a day  enoxaparin Injectable 40 milliGRAM(s) SubCutaneous daily  influenza  Vaccine (HIGH DOSE) 0.7 milliLiter(s) IntraMuscular once  insulin glargine Injectable (LANTUS) 30 Unit(s) SubCutaneous at bedtime  insulin lispro (ADMELOG) corrective regimen sliding scale   SubCutaneous three times a day before meals  insulin lispro (ADMELOG) corrective regimen sliding scale   SubCutaneous at bedtime  insulin lispro Injectable (ADMELOG) 10 Unit(s) SubCutaneous three times a day before meals  losartan 100 milliGRAM(s) Oral daily  memantine 5 milliGRAM(s) Oral daily  metoprolol succinate  milliGRAM(s) Oral daily  prednisoLONE acetate 1% Suspension 1 Drop(s) Both EYES two times a day  traZODone 50 milliGRAM(s) Oral at bedtime    MEDICATIONS  (PRN):      Vital Signs Last 24 Hrs  T(C): 36.4 (30 Dec 2021 05:23), Max: 37 (29 Dec 2021 13:13)  T(F): 97.6 (30 Dec 2021 05:23), Max: 98.6 (29 Dec 2021 13:13)  HR: 115 (30 Dec 2021 06:10) (97 - 115)  BP: 148/81 (30 Dec 2021 06:10) (118/77 - 180/98)  BP(mean): --  RR: 17 (30 Dec 2021 06:10) (17 - 18)  SpO2: 97% (30 Dec 2021 06:10) (95% - 99%)    PHYSICAL EXAMINATION:  GENERAL: NAD, well built  HEAD:  Atraumatic, Normocephalic  EYES:  conjunctiva and sclera clear  NECK: Supple, No JVD, Normal thyroid  CHEST/LUNG: Clear to auscultation. Clear to percussion bilaterally; No rales, rhonchi, wheezing, or rubs  HEART: Regular rate and rhythm; No murmurs, rubs, or gallops  ABDOMEN: Soft, Nontender, Nondistended; Bowel sounds present, no pain or masses on palpation  NERVOUS SYSTEM:  Alert & Oriented X3  : voiding well  EXTREMITIES:  2+ Peripheral Pulses, No clubbing, cyanosis, or edema  SKIN: warm dry                          12.7   9.36  )-----------( 252      ( 29 Dec 2021 06:31 )             37.8     12-29    140  |  105  |  25<H>  ----------------------------<  199<H>  3.6   |  27  |  0.97    Ca    8.9      29 Dec 2021 09:33  Phos  4.5     12-29  Mg     2.0     12-29                I&O's Summary        Culture - Blood (collected 28 Dec 2021 19:02)  Source: .Blood Blood-Peripheral  Preliminary Report (29 Dec 2021 20:01):    No growth to date.    Culture - Blood (collected 28 Dec 2021 19:02)  Source: .Blood Blood-Peripheral  Preliminary Report (29 Dec 2021 20:01):    No growth to date.    Culture - Urine (collected 28 Dec 2021 07:12)  Source: Clean Catch Clean Catch (Midstream)  Final Report (29 Dec 2021 13:50):    <10,000 CFU/mL Normal Urogenital Julia        CAPILLARY BLOOD GLUCOSE      RADIOLOGY & ADDITIONAL TESTS:                   PGY-1 Progress Note discussed with attending    PAGER #: [[phone]] TILL 5:00 PM  PLEASE CONTACT ON CALL TEAM:  - On Call Team (Please refer to Roxy) FROM 5:00 PM - 8:30PM  - Nightfloat Team FROM 8:30 -7:30 AM    CHIEF COMPLAINT & BRIEF HOSPITAL COURSE:    71 y.o. F, ambulates independently, from [facility], w/ PMHx of Alzheimer's' dementia, hx of CVA, HTN, DM, MDD, and recurrent falls, was sent for being restless and combative, and hitting and kicking nursing home staff. Pt is alert and oriented x2. Patient is confused and does not remember fighting with the nursing home staff. PT recommended [facility]    INTERVAL HPI/OVERNIGHT EVENTS:     Patient was examined at bedside, AAOx3, stable, NAD. Refuses to take in meds this morning because she stated that she is already taking a lot in the evening. We spoke to her using a German , Katelyn ([id number]). She stated that she has no complaints such as headache, dizziness, chest pain, shortness of breath, palpitation. She still has noted weakness of the R arm due to an old shoulder fracture last month.    REVIEW OF SYSTEMS:  CONSTITUTIONAL: No fever, weight loss, or fatigue  RESPIRATORY: No cough, wheezing, chills or hemoptysis; No shortness of breath  CARDIOVASCULAR: No chest pain, palpitations, dizziness, or leg swelling  GASTROINTESTINAL: No abdominal pain. No nausea, vomiting, or hematemesis; No diarrhea or constipation. No melena or hematochezia.  GENITOURINARY: No dysuria or hematuria, urinary frequency  NEUROLOGICAL: No headaches, memory loss, loss of strength, numbness, or tremors  SKIN: No itching, burning, rashes, or lesions     MEDICATIONS  (STANDING):  ascorbic acid 500 milliGRAM(s) Oral daily  aspirin  chewable 81 milliGRAM(s) Oral daily  dorzolamide 2%/timolol 0.5% Ophthalmic Solution 1 Drop(s) Both EYES two times a day  enoxaparin Injectable 40 milliGRAM(s) SubCutaneous daily  influenza  Vaccine (HIGH DOSE) 0.7 milliLiter(s) IntraMuscular once  insulin glargine Injectable (LANTUS) 30 Unit(s) SubCutaneous at bedtime  insulin lispro (ADMELOG) corrective regimen sliding scale   SubCutaneous three times a day before meals  insulin lispro (ADMELOG) corrective regimen sliding scale   SubCutaneous at bedtime  insulin lispro Injectable (ADMELOG) 10 Unit(s) SubCutaneous three times a day before meals  losartan 100 milliGRAM(s) Oral daily  memantine 5 milliGRAM(s) Oral daily  metoprolol succinate  milliGRAM(s) Oral daily  prednisoLONE acetate 1% Suspension 1 Drop(s) Both EYES two times a day  traZODone 50 milliGRAM(s) Oral at bedtime    MEDICATIONS  (PRN):      Vital Signs Last 24 Hrs  T(C): 36.4 (30 Dec 2021 05:23), Max: 37 (29 Dec 2021 13:13)  T(F): 97.6 (30 Dec 2021 05:23), Max: 98.6 (29 Dec 2021 13:13)  HR: 115 (30 Dec 2021 06:10) (97 - 115)  BP: 148/81 (30 Dec 2021 06:10) (118/77 - 180/98)  BP(mean): --  RR: 17 (30 Dec 2021 06:10) (17 - 18)  SpO2: 97% (30 Dec 2021 06:10) (95% - 99%)    PHYSICAL EXAMINATION:  GENERAL: NAD, well built  HEAD:  Atraumatic, Normocephalic  EYES:  conjunctiva and sclera clear  NECK: Supple, No JVD, Normal thyroid  CHEST/LUNG: Clear to auscultation. Clear to percussion bilaterally; No rales, rhonchi, wheezing, or rubs  HEART: Regular rate and rhythm; No murmurs, rubs, or gallops  ABDOMEN: Soft, Nontender, Nondistended; Bowel sounds present, no pain or masses on palpation  NERVOUS SYSTEM:  Alert & Oriented X3  : voiding well  EXTREMITIES:  2+ Peripheral Pulses, No clubbing, cyanosis, or edema  SKIN: warm dry                          12.7   9.36  )-----------( 252      ( 29 Dec 2021 06:31 )             37.8     12-29    140  |  105  |  25<H>  ----------------------------<  199<H>  3.6   |  27  |  0.97    Ca    8.9      29 Dec 2021 09:33  Phos  4.5     12-29  Mg     2.0     12-29                I&O's Summary        Culture - Blood (collected 28 Dec 2021 19:02)  Source: .Blood Blood-Peripheral  Preliminary Report (29 Dec 2021 20:01):    No growth to date.    Culture - Blood (collected 28 Dec 2021 19:02)  Source: .Blood Blood-Peripheral  Preliminary Report (29 Dec 2021 20:01):    No growth to date.    Culture - Urine (collected 28 Dec 2021 07:12)  Source: Clean Catch Clean Catch (Midstream)  Final Report (29 Dec 2021 13:50):    <10,000 CFU/mL Normal Urogenital Julia        CAPILLARY BLOOD GLUCOSE      RADIOLOGY & ADDITIONAL TESTS:

## 2021-12-30 NOTE — PROGRESS NOTE ADULT - PROBLEM SELECTOR PLAN 10
- Lovenox for DVT prophylaxis RISK                                                          Points  [] Previous VTE                                           3  [] Thrombophilia                                        2  [] Lower limb paralysis                              2   [] Current Cancer                                       2   [x] Immobilization > 24 hrs                        1  [] ICU/CCU stay > 24 hours                       1  [x] Age > 60                                                   1    Score: 2    - Lovenox for DVT prophylaxis

## 2021-12-30 NOTE — CHART NOTE - NSCHARTNOTEFT_GEN_A_CORE
Was told by the nurse that patient is acting like she is taking medications, but spits out her medications to the garbage when people are not looking. She did this twice with Metoprolol XL&Losartan this morning.     Repeat BP is 148/81.

## 2021-12-30 NOTE — PROGRESS NOTE ADULT - ASSESSMENT
Patient is a 71 y.o. F, ambulates independently, from [facility], w/ PMHx of Alzheimer's' dementia, hx of CVA, HTN, DM, MDD, and recurrent falls, was sent for being restless and combative, and hitting and kicking nursing home staff. Admitted for dementia and agitation.

## 2021-12-30 NOTE — PROGRESS NOTE ADULT - PROBLEM SELECTOR PLAN 2
- increased forgetfulness   - most likely 2/2 dementia worsening vs infectious etiology vs metabolic  - f/u xray chest , procal   - for now will start azithro, ceftriaxone   - f/u BCx  - monitor bp  - currently stable and cooperative - increased forgetfulness   - most likely 2/2 dementia worsening vs infectious etiology vs metabolic  - xray chest , procal - neg  - for now will start azithro, ceftriaxone   - BCx - ngtd  - monitor bp  - currently stable and cooperative

## 2021-12-30 NOTE — PROGRESS NOTE ADULT - PROBLEM SELECTOR PLAN 4
- Chronic as per the patient  - EKG = sinus tachycardia, left deviation, ST changes at V6, T wave inversion in I and aVL, no active ischemia  - Troponin - neg

## 2021-12-30 NOTE — PROGRESS NOTE ADULT - PROBLEM SELECTOR PLAN 1
- Fighting with staff at NH but does not remember. Likely dementia + sundowning  - Home meds Memantine  - C/w home meds  - Neuro consult in AM, then please consider psych consult.  - SW consulted for placement  - currently stable and cooperative - Fighting with staff at NH but does not remember. Likely dementia + sundowning  - Home meds Memantine  - C/w home meds  - SW consulted for placement  - currently stable and cooperative  - pending d/c to [facility]

## 2021-12-31 LAB
GLUCOSE BLDC GLUCOMTR-MCNC: 207 MG/DL — HIGH (ref 70–99)
GLUCOSE BLDC GLUCOMTR-MCNC: 318 MG/DL — HIGH (ref 70–99)
GLUCOSE BLDC GLUCOMTR-MCNC: 320 MG/DL — HIGH (ref 70–99)
GLUCOSE BLDC GLUCOMTR-MCNC: 325 MG/DL — HIGH (ref 70–99)
GLUCOSE BLDC GLUCOMTR-MCNC: 361 MG/DL — HIGH (ref 70–99)

## 2021-12-31 PROCEDURE — 99231 SBSQ HOSP IP/OBS SF/LOW 25: CPT | Mod: GC

## 2021-12-31 RX ORDER — INSULIN GLARGINE 100 [IU]/ML
35 INJECTION, SOLUTION SUBCUTANEOUS AT BEDTIME
Refills: 0 | Status: DISCONTINUED | OUTPATIENT
Start: 2021-12-31 | End: 2022-01-01

## 2021-12-31 RX ORDER — INSULIN HUMAN 100 [IU]/ML
5 INJECTION, SOLUTION SUBCUTANEOUS ONCE
Refills: 0 | Status: COMPLETED | OUTPATIENT
Start: 2021-12-31 | End: 2021-12-31

## 2021-12-31 RX ORDER — INSULIN LISPRO 100/ML
VIAL (ML) SUBCUTANEOUS
Refills: 0 | Status: DISCONTINUED | OUTPATIENT
Start: 2021-12-31 | End: 2021-12-31

## 2021-12-31 RX ADMIN — LOSARTAN POTASSIUM 100 MILLIGRAM(S): 100 TABLET, FILM COATED ORAL at 05:21

## 2021-12-31 RX ADMIN — MEMANTINE HYDROCHLORIDE 5 MILLIGRAM(S): 10 TABLET ORAL at 18:17

## 2021-12-31 RX ADMIN — Medication 10 UNIT(S): at 17:25

## 2021-12-31 RX ADMIN — Medication 10 UNIT(S): at 12:22

## 2021-12-31 RX ADMIN — ENOXAPARIN SODIUM 40 MILLIGRAM(S): 100 INJECTION SUBCUTANEOUS at 12:24

## 2021-12-31 RX ADMIN — Medication 100 MILLIGRAM(S): at 05:20

## 2021-12-31 RX ADMIN — INSULIN GLARGINE 35 UNIT(S): 100 INJECTION, SOLUTION SUBCUTANEOUS at 21:36

## 2021-12-31 RX ADMIN — INSULIN HUMAN 5 UNIT(S): 100 INJECTION, SOLUTION SUBCUTANEOUS at 21:35

## 2021-12-31 RX ADMIN — Medication 500 MILLIGRAM(S): at 12:23

## 2021-12-31 RX ADMIN — Medication 10 UNIT(S): at 07:41

## 2021-12-31 RX ADMIN — Medication 2: at 07:40

## 2021-12-31 RX ADMIN — Medication 1 DROP(S): at 18:06

## 2021-12-31 RX ADMIN — Medication 50 MILLIGRAM(S): at 21:35

## 2021-12-31 RX ADMIN — Medication 81 MILLIGRAM(S): at 12:23

## 2021-12-31 RX ADMIN — DORZOLAMIDE HYDROCHLORIDE TIMOLOL MALEATE 1 DROP(S): 20; 5 SOLUTION/ DROPS OPHTHALMIC at 18:07

## 2021-12-31 RX ADMIN — Medication 5: at 17:25

## 2021-12-31 RX ADMIN — Medication 4: at 11:51

## 2021-12-31 RX ADMIN — DORZOLAMIDE HYDROCHLORIDE TIMOLOL MALEATE 1 DROP(S): 20; 5 SOLUTION/ DROPS OPHTHALMIC at 05:21

## 2021-12-31 RX ADMIN — Medication 1 DROP(S): at 05:21

## 2021-12-31 NOTE — PROGRESS NOTE ADULT - PROBLEM SELECTOR PLAN 2
- increased forgetfulness   - most likely 2/2 dementia worsening vs infectious etiology vs metabolic  - xray chest , procal - neg  - for now will start azithro, ceftriaxone   - BCx - ngtd  - monitor bp  - currently stable and cooperative

## 2021-12-31 NOTE — PROGRESS NOTE ADULT - SUBJECTIVE AND OBJECTIVE BOX
PGY-1 Progress Note discussed with attending    PAGER #: [[phone]] TILL 5:00 PM  PLEASE CONTACT ON CALL TEAM:  - On Call Team (Please refer to Roxy) FROM 5:00 PM - 8:30PM  - Nightfloat Team FROM 8:30 -7:30 AM    CHIEF COMPLAINT & BRIEF HOSPITAL COURSE:      INTERVAL HPI/OVERNIGHT EVENTS:       REVIEW OF SYSTEMS:  CONSTITUTIONAL: No fever, weight loss, or fatigue  RESPIRATORY: No cough, wheezing, chills or hemoptysis; No shortness of breath  CARDIOVASCULAR: No chest pain, palpitations, dizziness, or leg swelling  GASTROINTESTINAL: No abdominal pain. No nausea, vomiting, or hematemesis; No diarrhea or constipation. No melena or hematochezia.  GENITOURINARY: No dysuria or hematuria, urinary frequency  NEUROLOGICAL: No headaches, memory loss, loss of strength, numbness, or tremors  SKIN: No itching, burning, rashes, or lesions     MEDICATIONS  (STANDING):  ascorbic acid 500 milliGRAM(s) Oral daily  aspirin  chewable 81 milliGRAM(s) Oral daily  dorzolamide 2%/timolol 0.5% Ophthalmic Solution 1 Drop(s) Both EYES two times a day  enoxaparin Injectable 40 milliGRAM(s) SubCutaneous daily  influenza  Vaccine (HIGH DOSE) 0.7 milliLiter(s) IntraMuscular once  insulin glargine Injectable (LANTUS) 30 Unit(s) SubCutaneous at bedtime  insulin lispro (ADMELOG) corrective regimen sliding scale   SubCutaneous three times a day before meals  insulin lispro (ADMELOG) corrective regimen sliding scale   SubCutaneous at bedtime  insulin lispro Injectable (ADMELOG) 10 Unit(s) SubCutaneous three times a day before meals  losartan 100 milliGRAM(s) Oral daily  memantine 5 milliGRAM(s) Oral daily  metoprolol succinate  milliGRAM(s) Oral daily  prednisoLONE acetate 1% Suspension 1 Drop(s) Both EYES two times a day  traZODone 50 milliGRAM(s) Oral at bedtime    MEDICATIONS  (PRN):      Vital Signs Last 24 Hrs  T(C): 36.8 (31 Dec 2021 05:01), Max: 37 (30 Dec 2021 13:32)  T(F): 98.3 (31 Dec 2021 05:01), Max: 98.6 (30 Dec 2021 13:32)  HR: 109 (31 Dec 2021 05:01) (92 - 116)  BP: 143/89 (31 Dec 2021 05:01) (123/73 - 143/89)  BP(mean): --  RR: 16 (31 Dec 2021 05:01) (16 - 16)  SpO2: 98% (31 Dec 2021 05:01) (96% - 98%)    PHYSICAL EXAMINATION:  GENERAL: NAD, well built  HEAD:  Atraumatic, Normocephalic  EYES:  conjunctiva and sclera clear  NECK: Supple, No JVD, Normal thyroid  CHEST/LUNG: Clear to auscultation. Clear to percussion bilaterally; No rales, rhonchi, wheezing, or rubs  HEART: Regular rate and rhythm; No murmurs, rubs, or gallops  ABDOMEN: Soft, Nontender, Nondistended; Bowel sounds present, no pain or masses on palpation  NERVOUS SYSTEM:  Alert & Oriented X3  : voiding well  EXTREMITIES:  2+ Peripheral Pulses, No clubbing, cyanosis, or edema  SKIN: warm dry      12-29    140  |  105  |  25<H>  ----------------------------<  199<H>  3.6   |  27  |  0.97    Ca    8.9      29 Dec 2021 09:33  Phos  4.5     12-29  Mg     2.0     12-29                I&O's Summary        Culture - Blood (collected 28 Dec 2021 19:02)  Source: .Blood Blood-Peripheral  Preliminary Report (29 Dec 2021 20:01):    No growth to date.    Culture - Blood (collected 28 Dec 2021 19:02)  Source: .Blood Blood-Peripheral  Preliminary Report (29 Dec 2021 20:01):    No growth to date.        CAPILLARY BLOOD GLUCOSE      RADIOLOGY & ADDITIONAL TESTS:                   PGY-1 Progress Note discussed with attending    PAGER #: [[phone]] TILL 5:00 PM  PLEASE CONTACT ON CALL TEAM:  - On Call Team (Please refer to Roxy) FROM 5:00 PM - 8:30PM  - Nightfloat Team FROM 8:30 -7:30 AM    CHIEF COMPLAINT & BRIEF HOSPITAL COURSE:    71 y.o. F, ambulates independently, from [facility], w/ PMHx of Alzheimer's' dementia, hx of CVA, HTN, DM, MDD, and recurrent falls, was sent for being restless and combative, and hitting and kicking nursing home staff. Pt is alert and oriented x2. Patient is confused and does not remember fighting with the nursing home staff. PT recommended [facility]    INTERVAL HPI/OVERNIGHT EVENTS:     Patient was examined at bedside, AAOx4, stable, NAD. We spoke to her in Salvadorean using an , Reed ([id number]). She has no complaints. She has no overnight events. Pending discharge to [facility].    REVIEW OF SYSTEMS:  CONSTITUTIONAL: No fever, weight loss, or fatigue  RESPIRATORY: No cough, wheezing, chills or hemoptysis; No shortness of breath  CARDIOVASCULAR: No chest pain, palpitations, dizziness, or leg swelling  GASTROINTESTINAL: No abdominal pain. No nausea, vomiting, or hematemesis; No diarrhea or constipation. No melena or hematochezia.  GENITOURINARY: No dysuria or hematuria, urinary frequency  NEUROLOGICAL: No headaches, memory loss, loss of strength, numbness, or tremors  SKIN: No itching, burning, rashes, or lesions     MEDICATIONS  (STANDING):  ascorbic acid 500 milliGRAM(s) Oral daily  aspirin  chewable 81 milliGRAM(s) Oral daily  dorzolamide 2%/timolol 0.5% Ophthalmic Solution 1 Drop(s) Both EYES two times a day  enoxaparin Injectable 40 milliGRAM(s) SubCutaneous daily  influenza  Vaccine (HIGH DOSE) 0.7 milliLiter(s) IntraMuscular once  insulin glargine Injectable (LANTUS) 30 Unit(s) SubCutaneous at bedtime  insulin lispro (ADMELOG) corrective regimen sliding scale   SubCutaneous three times a day before meals  insulin lispro (ADMELOG) corrective regimen sliding scale   SubCutaneous at bedtime  insulin lispro Injectable (ADMELOG) 10 Unit(s) SubCutaneous three times a day before meals  losartan 100 milliGRAM(s) Oral daily  memantine 5 milliGRAM(s) Oral daily  metoprolol succinate  milliGRAM(s) Oral daily  prednisoLONE acetate 1% Suspension 1 Drop(s) Both EYES two times a day  traZODone 50 milliGRAM(s) Oral at bedtime    MEDICATIONS  (PRN):      Vital Signs Last 24 Hrs  T(C): 36.8 (31 Dec 2021 05:01), Max: 37 (30 Dec 2021 13:32)  T(F): 98.3 (31 Dec 2021 05:01), Max: 98.6 (30 Dec 2021 13:32)  HR: 109 (31 Dec 2021 05:01) (92 - 116)  BP: 143/89 (31 Dec 2021 05:01) (123/73 - 143/89)  BP(mean): --  RR: 16 (31 Dec 2021 05:01) (16 - 16)  SpO2: 98% (31 Dec 2021 05:01) (96% - 98%)    PHYSICAL EXAMINATION:  GENERAL: NAD, well built  HEAD:  Atraumatic, Normocephalic  EYES:  conjunctiva and sclera clear  NECK: Supple, No JVD, Normal thyroid  CHEST/LUNG: Clear to auscultation. Clear to percussion bilaterally; No rales, rhonchi, wheezing, or rubs  HEART: Regular rate and rhythm; No murmurs, rubs, or gallops  ABDOMEN: Soft, Nontender, Nondistended; Bowel sounds present, no pain or masses on palpation  NERVOUS SYSTEM:  Alert & Oriented X3  : voiding well  EXTREMITIES:  2+ Peripheral Pulses, No clubbing, cyanosis, or edema  SKIN: warm dry      12-29    140  |  105  |  25<H>  ----------------------------<  199<H>  3.6   |  27  |  0.97    Ca    8.9      29 Dec 2021 09:33  Phos  4.5     12-29  Mg     2.0     12-29                I&O's Summary        Culture - Blood (collected 28 Dec 2021 19:02)  Source: .Blood Blood-Peripheral  Preliminary Report (29 Dec 2021 20:01):    No growth to date.    Culture - Blood (collected 28 Dec 2021 19:02)  Source: .Blood Blood-Peripheral  Preliminary Report (29 Dec 2021 20:01):    No growth to date.        CAPILLARY BLOOD GLUCOSE      RADIOLOGY & ADDITIONAL TESTS:

## 2021-12-31 NOTE — PROGRESS NOTE ADULT - PROBLEM SELECTOR PLAN 7
- Home lantus 30U, admelog 10U tid.  - c/w home meds  - HbA1c 12.1 in 11/23/2021  - Insulin ss  - FS [facility] - Home lantus 30U, admelog 10U tid.  - c/w home meds  - HbA1c 12.1 in 11/23/2021  - Insulin ss (moderate)  - started lantus 30u HS and admelog 10 AC  - FS [facility]

## 2021-12-31 NOTE — PROGRESS NOTE ADULT - PROBLEM SELECTOR PLAN 10
PATIENT NEEDS AN ALTERNATIVE FOR HER PANTOPRAZOLE BECAUSE HER INSURANCE WILL NOT TAKE IT. RISK                                                          Points  [] Previous VTE                                           3  [] Thrombophilia                                        2  [] Lower limb paralysis                              2   [] Current Cancer                                       2   [x] Immobilization > 24 hrs                        1  [] ICU/CCU stay > 24 hours                       1  [x] Age > 60                                                   1    Score: 2    - Lovenox for DVT prophylaxis

## 2021-12-31 NOTE — PROGRESS NOTE ADULT - PROBLEM SELECTOR PLAN 3
- 2 days history of SOB  - saturating well on room air  - S/p COVID infection 11/16/21. Takes ASA, Vit C. Incentive spirometer  - Currently COVID negative  - CXR - neg

## 2021-12-31 NOTE — PROGRESS NOTE ADULT - PROBLEM SELECTOR PLAN 1
- Fighting with staff at NH but does not remember. Likely dementia + sundowning  - Home meds Memantine  - C/w home meds  - SW consulted for placement  - currently stable and cooperative  - pending d/c to [facility]

## 2021-12-31 NOTE — PROGRESS NOTE ADULT - ASSESSMENT
Patient is a 71 y.o. F, ambulates independently, from [facility], w/ PMHx of Alzheimer's' dementia, hx of CVA, HTN, DM, MDD, and recurrent falls, was sent for being restless and combative, and hitting and kicking nursing home staff. Admitted for dementia and agitation.

## 2022-01-01 LAB
A1C WITH ESTIMATED AVERAGE GLUCOSE RESULT: 10.7 % — HIGH (ref 4–5.6)
ESTIMATED AVERAGE GLUCOSE: 260 MG/DL — HIGH (ref 68–114)
GLUCOSE BLDC GLUCOMTR-MCNC: 231 MG/DL — HIGH (ref 70–99)
GLUCOSE BLDC GLUCOMTR-MCNC: 262 MG/DL — HIGH (ref 70–99)
GLUCOSE BLDC GLUCOMTR-MCNC: 279 MG/DL — HIGH (ref 70–99)
GLUCOSE BLDC GLUCOMTR-MCNC: 330 MG/DL — HIGH (ref 70–99)

## 2022-01-01 PROCEDURE — 99232 SBSQ HOSP IP/OBS MODERATE 35: CPT | Mod: GC

## 2022-01-01 RX ORDER — INSULIN GLARGINE 100 [IU]/ML
40 INJECTION, SOLUTION SUBCUTANEOUS AT BEDTIME
Refills: 0 | Status: DISCONTINUED | OUTPATIENT
Start: 2022-01-01 | End: 2022-01-10

## 2022-01-01 RX ORDER — HYDRALAZINE HCL 50 MG
10 TABLET ORAL EVERY 8 HOURS
Refills: 0 | Status: DISCONTINUED | OUTPATIENT
Start: 2022-01-01 | End: 2022-01-02

## 2022-01-01 RX ORDER — INSULIN LISPRO 100/ML
12 VIAL (ML) SUBCUTANEOUS
Refills: 0 | Status: DISCONTINUED | OUTPATIENT
Start: 2022-01-01 | End: 2022-01-02

## 2022-01-01 RX ADMIN — Medication 10 UNIT(S): at 08:12

## 2022-01-01 RX ADMIN — Medication 10 UNIT(S): at 11:29

## 2022-01-01 RX ADMIN — Medication 2: at 08:11

## 2022-01-01 RX ADMIN — MEMANTINE HYDROCHLORIDE 5 MILLIGRAM(S): 10 TABLET ORAL at 11:31

## 2022-01-01 RX ADMIN — Medication 1 DROP(S): at 17:08

## 2022-01-01 RX ADMIN — Medication 500 MILLIGRAM(S): at 13:24

## 2022-01-01 RX ADMIN — Medication 81 MILLIGRAM(S): at 13:23

## 2022-01-01 RX ADMIN — Medication 3: at 11:28

## 2022-01-01 RX ADMIN — ENOXAPARIN SODIUM 40 MILLIGRAM(S): 100 INJECTION SUBCUTANEOUS at 13:23

## 2022-01-01 RX ADMIN — INSULIN GLARGINE 40 UNIT(S): 100 INJECTION, SOLUTION SUBCUTANEOUS at 21:56

## 2022-01-01 RX ADMIN — Medication 4: at 17:07

## 2022-01-01 RX ADMIN — DORZOLAMIDE HYDROCHLORIDE TIMOLOL MALEATE 1 DROP(S): 20; 5 SOLUTION/ DROPS OPHTHALMIC at 05:45

## 2022-01-01 RX ADMIN — LOSARTAN POTASSIUM 100 MILLIGRAM(S): 100 TABLET, FILM COATED ORAL at 05:45

## 2022-01-01 RX ADMIN — Medication 1 DROP(S): at 05:45

## 2022-01-01 RX ADMIN — DORZOLAMIDE HYDROCHLORIDE TIMOLOL MALEATE 1 DROP(S): 20; 5 SOLUTION/ DROPS OPHTHALMIC at 17:08

## 2022-01-01 RX ADMIN — Medication 50 MILLIGRAM(S): at 21:54

## 2022-01-01 RX ADMIN — Medication 10 MILLIGRAM(S): at 04:53

## 2022-01-01 RX ADMIN — Medication 100 MILLIGRAM(S): at 05:45

## 2022-01-01 RX ADMIN — Medication 10 UNIT(S): at 17:07

## 2022-01-01 RX ADMIN — Medication 10 MILLIGRAM(S): at 15:17

## 2022-01-01 NOTE — PROGRESS NOTE ADULT - PROBLEM SELECTOR PLAN 2
- increased forgetfulness   - most likely 2/2 dementia worsening vs infectious etiology vs metabolic  - xray chest , procal - neg  -Off abx  - BCx - ngtd  - monitor bp  - currently stable and cooperative

## 2022-01-01 NOTE — PROGRESS NOTE ADULT - ASSESSMENT
Patient is a 71 y.o. F, ambulates independently, from [facility], w/ PMHx of Alzheimer's' dementia, hx of CVA, HTN, DM, MDD, and recurrent falls, was sent for being restless and combative, and hitting and kicking nursing home staff. Admitted for dementia and agitation though patient is currently calm and cooperative at [facility].

## 2022-01-01 NOTE — PROGRESS NOTE ADULT - PROBLEM SELECTOR PLAN 10
RISK                                                          Points  [] Previous VTE                                           3  [] Thrombophilia                                        2  [] Lower limb paralysis                              2   [] Current Cancer                                       2   [x] Immobilization > 24 hrs                        1  [] ICU/CCU stay > 24 hours                       1  [x] Age > 60                                                   1    Score: 2    - Lovenox for DVT prophylaxis

## 2022-01-01 NOTE — PROGRESS NOTE ADULT - SUBJECTIVE AND OBJECTIVE BOX
[facility] Medicine    Patient is a 71y old  Female who presents with a chief complaint of Agitation+Dementia (31 Dec 2021 07:20)      SUBJECTIVE / OVERNIGHT EVENTS: Communicated with patient via Armenian  [id number], Jose Daniel. Patient c/o watery eyes but no itching. Denies any cough, SOB, or other complaints.    MEDICATIONS  (STANDING):  ascorbic acid 500 milliGRAM(s) Oral daily  aspirin  chewable 81 milliGRAM(s) Oral daily  dorzolamide 2%/timolol 0.5% Ophthalmic Solution 1 Drop(s) Both EYES two times a day  enoxaparin Injectable 40 milliGRAM(s) SubCutaneous daily  influenza  Vaccine (HIGH DOSE) 0.7 milliLiter(s) IntraMuscular once  insulin glargine Injectable (LANTUS) 40 Unit(s) SubCutaneous at bedtime  insulin lispro (ADMELOG) corrective regimen sliding scale   SubCutaneous three times a day before meals  insulin lispro Injectable (ADMELOG) 12 Unit(s) SubCutaneous three times a day before meals  losartan 100 milliGRAM(s) Oral daily  memantine 5 milliGRAM(s) Oral daily  metoprolol succinate  milliGRAM(s) Oral daily  prednisoLONE acetate 1% Suspension 1 Drop(s) Both EYES two times a day  traZODone 50 milliGRAM(s) Oral at bedtime    MEDICATIONS  (PRN):  hydrALAZINE Injectable 10 milliGRAM(s) IV Push every 8 hours PRN SBP >170      Vital Signs Last 24 Hrs  T(C): 36.6 (01-01-22 @ 14:46)  T(F): 97.9 (01-01-22 @ 14:46), Max: 98.6 (12-31-21 @ 21:07)  HR: 94 (01-01-22 @ 14:46) (72 - 105)  BP: 170/85 (01-01-22 @ 14:46)  BP(mean): --  RR: 18 (01-01-22 @ 14:46) (17 - 18)  SpO2: 96% (01-01-22 @ 14:46) (94% - 96%)  Wt(kg): --    CAPILLARY BLOOD GLUCOSE      POCT Blood Glucose.: 330 mg/dL (01 Jan 2022 16:57)  POCT Blood Glucose.: 262 mg/dL (01 Jan 2022 11:13)  POCT Blood Glucose.: 231 mg/dL (01 Jan 2022 08:01)  POCT Blood Glucose.: 318 mg/dL (31 Dec 2021 21:49)  POCT Blood Glucose.: 320 mg/dL (31 Dec 2021 20:40)    I&O's Summary      PHYSICAL EXAM:  GENERAL: NAD, well-developed, watching TV  HEAD:  Atraumatic, Normocephalic  EYES:  conjunctiva and sclera clear  NECK: Supple, No JVD  CHEST/LUNG: Clear to auscultation bilaterally; No wheeze, rhonchi, rales  HEART: Regular rate and rhythm; No murmurs, rubs, or gallops  ABDOMEN: Soft, Nontender, Nondistended; Bowel sounds present  EXTREMITIES:  2+ Peripheral Pulses, No clubbing, cyanosis, or edema  PSYCH: AAOx2, calm, cooperative  NEUROLOGY: non-focal  SKIN: No rashes or lesions    LABS:                    RADIOLOGY & ADDITIONAL TESTS:    Imaging Personally Reviewed:    Consultant(s) Notes Reviewed:      Care Discussed with Consultants/Other Providers:    Assessment and Plan:

## 2022-01-01 NOTE — PROGRESS NOTE ADULT - PROBLEM SELECTOR PLAN 7
- Home lantus 30U, admelog 10U tid.  - Glucose still elevated  - HbA1c 12.1 in 11/23/2021  - Insulin ss (moderate)  - Increase lantus to 40u HS and admelog 12U AC  - FS achs

## 2022-01-02 LAB
ANION GAP SERPL CALC-SCNC: 5 MMOL/L — SIGNIFICANT CHANGE UP (ref 5–17)
BUN SERPL-MCNC: 23 MG/DL — HIGH (ref 7–18)
CALCIUM SERPL-MCNC: 9.3 MG/DL — SIGNIFICANT CHANGE UP (ref 8.4–10.5)
CHLORIDE SERPL-SCNC: 105 MMOL/L — SIGNIFICANT CHANGE UP (ref 96–108)
CO2 SERPL-SCNC: 29 MMOL/L — SIGNIFICANT CHANGE UP (ref 22–31)
CREAT SERPL-MCNC: 0.77 MG/DL — SIGNIFICANT CHANGE UP (ref 0.5–1.3)
CULTURE RESULTS: SIGNIFICANT CHANGE UP
CULTURE RESULTS: SIGNIFICANT CHANGE UP
GLUCOSE BLDC GLUCOMTR-MCNC: 218 MG/DL — HIGH (ref 70–99)
GLUCOSE BLDC GLUCOMTR-MCNC: 224 MG/DL — HIGH (ref 70–99)
GLUCOSE BLDC GLUCOMTR-MCNC: 255 MG/DL — HIGH (ref 70–99)
GLUCOSE BLDC GLUCOMTR-MCNC: 288 MG/DL — HIGH (ref 70–99)
GLUCOSE SERPL-MCNC: 289 MG/DL — HIGH (ref 70–99)
HCT VFR BLD CALC: 42.4 % — SIGNIFICANT CHANGE UP (ref 34.5–45)
HGB BLD-MCNC: 13.9 G/DL — SIGNIFICANT CHANGE UP (ref 11.5–15.5)
MAGNESIUM SERPL-MCNC: 2.1 MG/DL — SIGNIFICANT CHANGE UP (ref 1.6–2.6)
MCHC RBC-ENTMCNC: 29.3 PG — SIGNIFICANT CHANGE UP (ref 27–34)
MCHC RBC-ENTMCNC: 32.8 GM/DL — SIGNIFICANT CHANGE UP (ref 32–36)
MCV RBC AUTO: 89.3 FL — SIGNIFICANT CHANGE UP (ref 80–100)
NRBC # BLD: 0 /100 WBCS — SIGNIFICANT CHANGE UP (ref 0–0)
PHOSPHATE SERPL-MCNC: 3.5 MG/DL — SIGNIFICANT CHANGE UP (ref 2.5–4.5)
PLATELET # BLD AUTO: 290 K/UL — SIGNIFICANT CHANGE UP (ref 150–400)
POTASSIUM SERPL-MCNC: 4.4 MMOL/L — SIGNIFICANT CHANGE UP (ref 3.5–5.3)
POTASSIUM SERPL-SCNC: 4.4 MMOL/L — SIGNIFICANT CHANGE UP (ref 3.5–5.3)
RBC # BLD: 4.75 M/UL — SIGNIFICANT CHANGE UP (ref 3.8–5.2)
RBC # FLD: 15.1 % — HIGH (ref 10.3–14.5)
SODIUM SERPL-SCNC: 139 MMOL/L — SIGNIFICANT CHANGE UP (ref 135–145)
SPECIMEN SOURCE: SIGNIFICANT CHANGE UP
SPECIMEN SOURCE: SIGNIFICANT CHANGE UP
WBC # BLD: 5.79 K/UL — SIGNIFICANT CHANGE UP (ref 3.8–10.5)
WBC # FLD AUTO: 5.79 K/UL — SIGNIFICANT CHANGE UP (ref 3.8–10.5)

## 2022-01-02 PROCEDURE — 99232 SBSQ HOSP IP/OBS MODERATE 35: CPT | Mod: GC

## 2022-01-02 RX ORDER — METOPROLOL TARTRATE 50 MG
5 TABLET ORAL EVERY 6 HOURS
Refills: 0 | Status: DISCONTINUED | OUTPATIENT
Start: 2022-01-02 | End: 2022-01-10

## 2022-01-02 RX ORDER — INSULIN LISPRO 100/ML
15 VIAL (ML) SUBCUTANEOUS
Refills: 0 | Status: DISCONTINUED | OUTPATIENT
Start: 2022-01-02 | End: 2022-01-03

## 2022-01-02 RX ORDER — LANOLIN ALCOHOL/MO/W.PET/CERES
3 CREAM (GRAM) TOPICAL AT BEDTIME
Refills: 0 | Status: DISCONTINUED | OUTPATIENT
Start: 2022-01-02 | End: 2022-01-10

## 2022-01-02 RX ADMIN — INSULIN GLARGINE 40 UNIT(S): 100 INJECTION, SOLUTION SUBCUTANEOUS at 21:35

## 2022-01-02 RX ADMIN — Medication 50 MILLIGRAM(S): at 21:35

## 2022-01-02 RX ADMIN — Medication 2: at 08:32

## 2022-01-02 RX ADMIN — Medication 81 MILLIGRAM(S): at 12:40

## 2022-01-02 RX ADMIN — MEMANTINE HYDROCHLORIDE 5 MILLIGRAM(S): 10 TABLET ORAL at 12:41

## 2022-01-02 RX ADMIN — Medication 500 MILLIGRAM(S): at 12:40

## 2022-01-02 RX ADMIN — ENOXAPARIN SODIUM 40 MILLIGRAM(S): 100 INJECTION SUBCUTANEOUS at 12:41

## 2022-01-02 RX ADMIN — Medication 15 UNIT(S): at 11:48

## 2022-01-02 RX ADMIN — DORZOLAMIDE HYDROCHLORIDE TIMOLOL MALEATE 1 DROP(S): 20; 5 SOLUTION/ DROPS OPHTHALMIC at 18:21

## 2022-01-02 RX ADMIN — DORZOLAMIDE HYDROCHLORIDE TIMOLOL MALEATE 1 DROP(S): 20; 5 SOLUTION/ DROPS OPHTHALMIC at 05:36

## 2022-01-02 RX ADMIN — Medication 2: at 17:12

## 2022-01-02 RX ADMIN — Medication 1 DROP(S): at 05:35

## 2022-01-02 RX ADMIN — LOSARTAN POTASSIUM 100 MILLIGRAM(S): 100 TABLET, FILM COATED ORAL at 05:35

## 2022-01-02 RX ADMIN — Medication 3: at 11:47

## 2022-01-02 RX ADMIN — Medication 15 UNIT(S): at 17:12

## 2022-01-02 RX ADMIN — Medication 5 MILLIGRAM(S): at 21:35

## 2022-01-02 RX ADMIN — Medication 12 UNIT(S): at 08:33

## 2022-01-02 RX ADMIN — Medication 1 DROP(S): at 18:21

## 2022-01-02 RX ADMIN — Medication 100 MILLIGRAM(S): at 05:35

## 2022-01-02 NOTE — PROGRESS NOTE ADULT - SUBJECTIVE AND OBJECTIVE BOX
PGY-1 Progress Note discussed with attending    PAGER #: [[phone]] TILL 5:00 PM  PLEASE CONTACT ON CALL TEAM:  - On Call Team (Please refer to Roxy) FROM 5:00 PM - 8:30PM  - Nightfloat Team FROM 8:30 -7:30 AM    CHIEF COMPLAINT & BRIEF HOSPITAL COURSE:      INTERVAL HPI/OVERNIGHT EVENTS:       REVIEW OF SYSTEMS:  CONSTITUTIONAL: No fever, weight loss, or fatigue  RESPIRATORY: No cough, wheezing, chills or hemoptysis; No shortness of breath  CARDIOVASCULAR: No chest pain, palpitations, dizziness, or leg swelling  GASTROINTESTINAL: No abdominal pain. No nausea, vomiting, or hematemesis; No diarrhea or constipation. No melena or hematochezia.  GENITOURINARY: No dysuria or hematuria, urinary frequency  NEUROLOGICAL: No headaches, memory loss, loss of strength, numbness, or tremors  SKIN: No itching, burning, rashes, or lesions     MEDICATIONS  (STANDING):  ascorbic acid 500 milliGRAM(s) Oral daily  aspirin  chewable 81 milliGRAM(s) Oral daily  dorzolamide 2%/timolol 0.5% Ophthalmic Solution 1 Drop(s) Both EYES two times a day  enoxaparin Injectable 40 milliGRAM(s) SubCutaneous daily  influenza  Vaccine (HIGH DOSE) 0.7 milliLiter(s) IntraMuscular once  insulin glargine Injectable (LANTUS) 40 Unit(s) SubCutaneous at bedtime  insulin lispro (ADMELOG) corrective regimen sliding scale   SubCutaneous three times a day before meals  insulin lispro Injectable (ADMELOG) 12 Unit(s) SubCutaneous three times a day before meals  losartan 100 milliGRAM(s) Oral daily  memantine 5 milliGRAM(s) Oral daily  metoprolol succinate  milliGRAM(s) Oral daily  prednisoLONE acetate 1% Suspension 1 Drop(s) Both EYES two times a day  traZODone 50 milliGRAM(s) Oral at bedtime    MEDICATIONS  (PRN):  hydrALAZINE Injectable 10 milliGRAM(s) IV Push every 8 hours PRN SBP >170      Vital Signs Last 24 Hrs  T(C): 36.5 (02 Jan 2022 05:27), Max: 37 (01 Jan 2022 19:29)  T(F): 97.7 (02 Jan 2022 05:27), Max: 98.6 (01 Jan 2022 19:29)  HR: 86 (02 Jan 2022 05:27) (86 - 101)  BP: 162/87 (02 Jan 2022 05:27) (145/85 - 170/85)  BP(mean): --  RR: 18 (02 Jan 2022 05:27) (18 - 18)  SpO2: 98% (02 Jan 2022 05:27) (96% - 98%)    PHYSICAL EXAMINATION:  GENERAL: NAD, well built  HEAD:  Atraumatic, Normocephalic  EYES:  conjunctiva and sclera clear  NECK: Supple, No JVD, Normal thyroid  CHEST/LUNG: Clear to auscultation. Clear to percussion bilaterally; No rales, rhonchi, wheezing, or rubs  HEART: Regular rate and rhythm; No murmurs, rubs, or gallops  ABDOMEN: Soft, Nontender, Nondistended; Bowel sounds present, no pain or masses on palpation  NERVOUS SYSTEM:  Alert & Oriented X3  : voiding well  EXTREMITIES:  2+ Peripheral Pulses, No clubbing, cyanosis, or edema  SKIN: warm dry                      I&O's Summary          CAPILLARY BLOOD GLUCOSE      RADIOLOGY & ADDITIONAL TESTS:                   PGY-1 Progress Note discussed with attending    PAGER #: [[phone]] TILL 5:00 PM  PLEASE CONTACT ON CALL TEAM:  - On Call Team (Please refer to Roxy) FROM 5:00 PM - 8:30PM  - Nightfloat Team FROM 8:30 -7:30 AM    CHIEF COMPLAINT & BRIEF HOSPITAL COURSE:    71 y.o. F, ambulates independently, from [facility], w/ PMHx of Alzheimer's' dementia, hx of CVA, HTN, DM, MDD, and recurrent falls, was sent for being restless and combative, and hitting and kicking nursing home staff. Pt is alert and oriented x2. Patient is confused and does not remember fighting with the nursing home staff. PT recommended ESTELITA    INTERVAL HPI/OVERNIGHT EVENTS:     Patient was examined at bedside, AAOx4, stable, NAD. We spoke to her using a Salvadorean , Rosalva ([id number]). She has no complaints and no significant events overnight. Her blood pressure and blood sugar have been elevated. Insulin (Admelog) was inc. from 12 to 15 u premeals. Started on Hydralazine 10 mg IV push as needed.    REVIEW OF SYSTEMS:  CONSTITUTIONAL: No fever, weight loss, or fatigue  RESPIRATORY: No cough, wheezing, chills or hemoptysis; No shortness of breath  CARDIOVASCULAR: No chest pain, palpitations, dizziness, or leg swelling  GASTROINTESTINAL: No abdominal pain. No nausea, vomiting, or hematemesis; No diarrhea or constipation. No melena or hematochezia.  GENITOURINARY: No dysuria or hematuria, urinary frequency  NEUROLOGICAL: No headaches, memory loss, loss of strength, numbness, or tremors  SKIN: No itching, burning, rashes, or lesions     MEDICATIONS  (STANDING):  ascorbic acid 500 milliGRAM(s) Oral daily  aspirin  chewable 81 milliGRAM(s) Oral daily  dorzolamide 2%/timolol 0.5% Ophthalmic Solution 1 Drop(s) Both EYES two times a day  enoxaparin Injectable 40 milliGRAM(s) SubCutaneous daily  influenza  Vaccine (HIGH DOSE) 0.7 milliLiter(s) IntraMuscular once  insulin glargine Injectable (LANTUS) 40 Unit(s) SubCutaneous at bedtime  insulin lispro (ADMELOG) corrective regimen sliding scale   SubCutaneous three times a day before meals  insulin lispro Injectable (ADMELOG) 12 Unit(s) SubCutaneous three times a day before meals  losartan 100 milliGRAM(s) Oral daily  memantine 5 milliGRAM(s) Oral daily  metoprolol succinate  milliGRAM(s) Oral daily  prednisoLONE acetate 1% Suspension 1 Drop(s) Both EYES two times a day  traZODone 50 milliGRAM(s) Oral at bedtime    MEDICATIONS  (PRN):  hydrALAZINE Injectable 10 milliGRAM(s) IV Push every 8 hours PRN SBP >170      Vital Signs Last 24 Hrs  T(C): 36.5 (02 Jan 2022 05:27), Max: 37 (01 Jan 2022 19:29)  T(F): 97.7 (02 Jan 2022 05:27), Max: 98.6 (01 Jan 2022 19:29)  HR: 86 (02 Jan 2022 05:27) (86 - 101)  BP: 162/87 (02 Jan 2022 05:27) (145/85 - 170/85)  BP(mean): --  RR: 18 (02 Jan 2022 05:27) (18 - 18)  SpO2: 98% (02 Jan 2022 05:27) (96% - 98%)    PHYSICAL EXAMINATION:  GENERAL: NAD, well built  HEAD:  Atraumatic, Normocephalic  EYES:  conjunctiva and sclera clear  NECK: Supple, No JVD, Normal thyroid  CHEST/LUNG: Clear to auscultation. Clear to percussion bilaterally; No rales, rhonchi, wheezing, or rubs  HEART: Regular rate and rhythm; No murmurs, rubs, or gallops  ABDOMEN: Soft, Nontender, Nondistended; Bowel sounds present, no pain or masses on palpation  NERVOUS SYSTEM:  Alert & Oriented X3  : voiding well  EXTREMITIES:  2+ Peripheral Pulses, No clubbing, cyanosis, or edema  SKIN: warm dry                      I&O's Summary          CAPILLARY BLOOD GLUCOSE      RADIOLOGY & ADDITIONAL TESTS:

## 2022-01-02 NOTE — PROGRESS NOTE ADULT - ASSESSMENT
Patient is a 71 y.o. F, ambulates independently, from [facility], w/ PMHx of Alzheimer's' dementia, hx of CVA, HTN, DM, MDD, and recurrent falls, was sent for being restless and combative, and hitting and kicking nursing home staff. Admitted for dementia and agitation though patient is currently calm and cooperative at [facility].

## 2022-01-03 LAB
ANION GAP SERPL CALC-SCNC: 6 MMOL/L — SIGNIFICANT CHANGE UP (ref 5–17)
BUN SERPL-MCNC: 25 MG/DL — HIGH (ref 7–18)
CALCIUM SERPL-MCNC: 8.8 MG/DL — SIGNIFICANT CHANGE UP (ref 8.4–10.5)
CHLORIDE SERPL-SCNC: 109 MMOL/L — HIGH (ref 96–108)
CO2 SERPL-SCNC: 25 MMOL/L — SIGNIFICANT CHANGE UP (ref 22–31)
CREAT SERPL-MCNC: 0.69 MG/DL — SIGNIFICANT CHANGE UP (ref 0.5–1.3)
GLUCOSE BLDC GLUCOMTR-MCNC: 134 MG/DL — HIGH (ref 70–99)
GLUCOSE BLDC GLUCOMTR-MCNC: 237 MG/DL — HIGH (ref 70–99)
GLUCOSE BLDC GLUCOMTR-MCNC: 58 MG/DL — LOW (ref 70–99)
GLUCOSE BLDC GLUCOMTR-MCNC: 90 MG/DL — SIGNIFICANT CHANGE UP (ref 70–99)
GLUCOSE SERPL-MCNC: 163 MG/DL — HIGH (ref 70–99)
HCT VFR BLD CALC: 37.9 % — SIGNIFICANT CHANGE UP (ref 34.5–45)
HGB BLD-MCNC: 12.5 G/DL — SIGNIFICANT CHANGE UP (ref 11.5–15.5)
MAGNESIUM SERPL-MCNC: 2.1 MG/DL — SIGNIFICANT CHANGE UP (ref 1.6–2.6)
MCHC RBC-ENTMCNC: 29.1 PG — SIGNIFICANT CHANGE UP (ref 27–34)
MCHC RBC-ENTMCNC: 33 GM/DL — SIGNIFICANT CHANGE UP (ref 32–36)
MCV RBC AUTO: 88.1 FL — SIGNIFICANT CHANGE UP (ref 80–100)
NRBC # BLD: 0 /100 WBCS — SIGNIFICANT CHANGE UP (ref 0–0)
PHOSPHATE SERPL-MCNC: 3.8 MG/DL — SIGNIFICANT CHANGE UP (ref 2.5–4.5)
PLATELET # BLD AUTO: 258 K/UL — SIGNIFICANT CHANGE UP (ref 150–400)
POTASSIUM SERPL-MCNC: 3.5 MMOL/L — SIGNIFICANT CHANGE UP (ref 3.5–5.3)
POTASSIUM SERPL-SCNC: 3.5 MMOL/L — SIGNIFICANT CHANGE UP (ref 3.5–5.3)
RBC # BLD: 4.3 M/UL — SIGNIFICANT CHANGE UP (ref 3.8–5.2)
RBC # FLD: 14.8 % — HIGH (ref 10.3–14.5)
SARS-COV-2 RNA SPEC QL NAA+PROBE: SIGNIFICANT CHANGE UP
SODIUM SERPL-SCNC: 140 MMOL/L — SIGNIFICANT CHANGE UP (ref 135–145)
WBC # BLD: 6.58 K/UL — SIGNIFICANT CHANGE UP (ref 3.8–10.5)
WBC # FLD AUTO: 6.58 K/UL — SIGNIFICANT CHANGE UP (ref 3.8–10.5)

## 2022-01-03 PROCEDURE — 99232 SBSQ HOSP IP/OBS MODERATE 35: CPT | Mod: GC

## 2022-01-03 RX ORDER — INSULIN LISPRO 100/ML
7 VIAL (ML) SUBCUTANEOUS
Refills: 0 | Status: DISCONTINUED | OUTPATIENT
Start: 2022-01-03 | End: 2022-01-10

## 2022-01-03 RX ORDER — INSULIN LISPRO 100/ML
16 VIAL (ML) SUBCUTANEOUS
Refills: 0 | Status: DISCONTINUED | OUTPATIENT
Start: 2022-01-03 | End: 2022-01-03

## 2022-01-03 RX ADMIN — Medication 50 MILLIGRAM(S): at 21:22

## 2022-01-03 RX ADMIN — Medication 1 DROP(S): at 17:26

## 2022-01-03 RX ADMIN — Medication 15 UNIT(S): at 08:03

## 2022-01-03 RX ADMIN — Medication 1 DROP(S): at 06:25

## 2022-01-03 RX ADMIN — LOSARTAN POTASSIUM 100 MILLIGRAM(S): 100 TABLET, FILM COATED ORAL at 06:25

## 2022-01-03 RX ADMIN — DORZOLAMIDE HYDROCHLORIDE TIMOLOL MALEATE 1 DROP(S): 20; 5 SOLUTION/ DROPS OPHTHALMIC at 17:25

## 2022-01-03 RX ADMIN — Medication 500 MILLIGRAM(S): at 11:25

## 2022-01-03 RX ADMIN — Medication 81 MILLIGRAM(S): at 11:24

## 2022-01-03 RX ADMIN — Medication 7 UNIT(S): at 16:24

## 2022-01-03 RX ADMIN — DORZOLAMIDE HYDROCHLORIDE TIMOLOL MALEATE 1 DROP(S): 20; 5 SOLUTION/ DROPS OPHTHALMIC at 06:25

## 2022-01-03 RX ADMIN — Medication 2: at 16:24

## 2022-01-03 RX ADMIN — MEMANTINE HYDROCHLORIDE 5 MILLIGRAM(S): 10 TABLET ORAL at 11:25

## 2022-01-03 RX ADMIN — ENOXAPARIN SODIUM 40 MILLIGRAM(S): 100 INJECTION SUBCUTANEOUS at 11:24

## 2022-01-03 RX ADMIN — Medication 100 MILLIGRAM(S): at 06:25

## 2022-01-03 RX ADMIN — INSULIN GLARGINE 40 UNIT(S): 100 INJECTION, SOLUTION SUBCUTANEOUS at 21:22

## 2022-01-03 NOTE — PROGRESS NOTE ADULT - SUBJECTIVE AND OBJECTIVE BOX
PGY-1 Progress Note discussed with attending    PAGER #: [[phone]] TILL 5:00 PM  PLEASE CONTACT ON CALL TEAM:  - On Call Team (Please refer to Roxy) FROM 5:00 PM - 8:30PM  - Nightfloat Team FROM 8:30 -7:30 AM    CHIEF COMPLAINT & BRIEF HOSPITAL COURSE:      INTERVAL HPI/OVERNIGHT EVENTS:       REVIEW OF SYSTEMS:  CONSTITUTIONAL: No fever, weight loss, or fatigue  RESPIRATORY: No cough, wheezing, chills or hemoptysis; No shortness of breath  CARDIOVASCULAR: No chest pain, palpitations, dizziness, or leg swelling  GASTROINTESTINAL: No abdominal pain. No nausea, vomiting, or hematemesis; No diarrhea or constipation. No melena or hematochezia.  GENITOURINARY: No dysuria or hematuria, urinary frequency  NEUROLOGICAL: No headaches, memory loss, loss of strength, numbness, or tremors  SKIN: No itching, burning, rashes, or lesions     MEDICATIONS  (STANDING):  ascorbic acid 500 milliGRAM(s) Oral daily  aspirin  chewable 81 milliGRAM(s) Oral daily  dorzolamide 2%/timolol 0.5% Ophthalmic Solution 1 Drop(s) Both EYES two times a day  enoxaparin Injectable 40 milliGRAM(s) SubCutaneous daily  influenza  Vaccine (HIGH DOSE) 0.7 milliLiter(s) IntraMuscular once  insulin glargine Injectable (LANTUS) 40 Unit(s) SubCutaneous at bedtime  insulin lispro (ADMELOG) corrective regimen sliding scale   SubCutaneous three times a day before meals  insulin lispro Injectable (ADMELOG) 15 Unit(s) SubCutaneous three times a day before meals  losartan 100 milliGRAM(s) Oral daily  memantine 5 milliGRAM(s) Oral daily  metoprolol succinate  milliGRAM(s) Oral daily  prednisoLONE acetate 1% Suspension 1 Drop(s) Both EYES two times a day  traZODone 50 milliGRAM(s) Oral at bedtime    MEDICATIONS  (PRN):  melatonin 3 milliGRAM(s) Oral at bedtime PRN Sleep  metoprolol tartrate Injectable 5 milliGRAM(s) IV Push every 6 hours PRN SBP >166      Vital Signs Last 24 Hrs  T(C): 36.5 (03 Jan 2022 05:24), Max: 36.9 (02 Jan 2022 13:54)  T(F): 97.7 (03 Jan 2022 05:24), Max: 98.5 (02 Jan 2022 13:54)  HR: 86 (03 Jan 2022 05:24) (86 - 92)  BP: 170/87 (03 Jan 2022 05:24) (126/79 - 180/90)  BP(mean): --  RR: 18 (03 Jan 2022 05:24) (18 - 18)  SpO2: 97% (03 Jan 2022 05:24) (92% - 97%)    PHYSICAL EXAMINATION:  GENERAL: NAD, well built  HEAD:  Atraumatic, Normocephalic  EYES:  conjunctiva and sclera clear  NECK: Supple, No JVD, Normal thyroid  CHEST/LUNG: Clear to auscultation. Clear to percussion bilaterally; No rales, rhonchi, wheezing, or rubs  HEART: Regular rate and rhythm; No murmurs, rubs, or gallops  ABDOMEN: Soft, Nontender, Nondistended; Bowel sounds present, no pain or masses on palpation  NERVOUS SYSTEM:  Alert & Oriented X3  : voiding well  EXTREMITIES:  2+ Peripheral Pulses, No clubbing, cyanosis, or edema  SKIN: warm dry                          12.5   6.58  )-----------( 258      ( 03 Jan 2022 06:31 )             37.9     01-03    140  |  109<H>  |  25<H>  ----------------------------<  163<H>  3.5   |  25  |  0.69    Ca    8.8      03 Jan 2022 06:31  Phos  3.8     01-03  Mg     2.1     01-03                I&O's Summary          CAPILLARY BLOOD GLUCOSE      RADIOLOGY & ADDITIONAL TESTS:                   PGY-1 Progress Note discussed with attending    PAGER #: [[phone]] TILL 5:00 PM  PLEASE CONTACT ON CALL TEAM:  - On Call Team (Please refer to Roxy) FROM 5:00 PM - 8:30PM  - Nightfloat Team FROM 8:30 -7:30 AM    CHIEF COMPLAINT & BRIEF HOSPITAL COURSE:    71 y.o. F, ambulates independently, from [facility], w/ PMHx of Alzheimer's' dementia, hx of CVA, HTN, DM, MDD, and recurrent falls, was sent for being restless and combative, and hitting and kicking nursing home staff. Pt is alert and oriented x2. Patient is confused and does not remember fighting with the nursing home staff. PT recommended ESTELITA    INTERVAL HPI/OVERNIGHT EVENTS:     Patient was examined at bedside, AAOx3, stable, NAD. We spoke to her using a Belarusian  ([id number]). She has no complaints overnight. Her blood pressure and blood glucose is now better controlled. No significant events overnight. Awaiting placement to [facility].    REVIEW OF SYSTEMS:  CONSTITUTIONAL: No fever, weight loss, or fatigue  RESPIRATORY: No cough, wheezing, chills or hemoptysis; No shortness of breath  CARDIOVASCULAR: No chest pain, palpitations, dizziness, or leg swelling  GASTROINTESTINAL: No abdominal pain. No nausea, vomiting, or hematemesis; No diarrhea or constipation. No melena or hematochezia.  GENITOURINARY: No dysuria or hematuria, urinary frequency  NEUROLOGICAL: No headaches, memory loss, loss of strength, numbness, or tremors  SKIN: No itching, burning, rashes, or lesions     MEDICATIONS  (STANDING):  ascorbic acid 500 milliGRAM(s) Oral daily  aspirin  chewable 81 milliGRAM(s) Oral daily  dorzolamide 2%/timolol 0.5% Ophthalmic Solution 1 Drop(s) Both EYES two times a day  enoxaparin Injectable 40 milliGRAM(s) SubCutaneous daily  influenza  Vaccine (HIGH DOSE) 0.7 milliLiter(s) IntraMuscular once  insulin glargine Injectable (LANTUS) 40 Unit(s) SubCutaneous at bedtime  insulin lispro (ADMELOG) corrective regimen sliding scale   SubCutaneous three times a day before meals  insulin lispro Injectable (ADMELOG) 15 Unit(s) SubCutaneous three times a day before meals  losartan 100 milliGRAM(s) Oral daily  memantine 5 milliGRAM(s) Oral daily  metoprolol succinate  milliGRAM(s) Oral daily  prednisoLONE acetate 1% Suspension 1 Drop(s) Both EYES two times a day  traZODone 50 milliGRAM(s) Oral at bedtime    MEDICATIONS  (PRN):  melatonin 3 milliGRAM(s) Oral at bedtime PRN Sleep  metoprolol tartrate Injectable 5 milliGRAM(s) IV Push every 6 hours PRN SBP >166      Vital Signs Last 24 Hrs  T(C): 36.5 (03 Jan 2022 05:24), Max: 36.9 (02 Jan 2022 13:54)  T(F): 97.7 (03 Jan 2022 05:24), Max: 98.5 (02 Jan 2022 13:54)  HR: 86 (03 Jan 2022 05:24) (86 - 92)  BP: 170/87 (03 Jan 2022 05:24) (126/79 - 180/90)  BP(mean): --  RR: 18 (03 Jan 2022 05:24) (18 - 18)  SpO2: 97% (03 Jan 2022 05:24) (92% - 97%)    PHYSICAL EXAMINATION:  GENERAL: NAD  HEAD:  Atraumatic, Normocephalic  EYES:  conjunctiva and sclera clear  NECK: Supple, No JVD, Normal thyroid  CHEST/LUNG: Clear to auscultation. Clear to percussion bilaterally; No rales, rhonchi, wheezing, or rubs  HEART: Regular rate and rhythm; No murmurs, rubs, or gallops  ABDOMEN: Soft, Nontender, Nondistended; Bowel sounds present, no pain or masses on palpation  NERVOUS SYSTEM:  Alert & Oriented X3  : voiding well  EXTREMITIES:  2+ Peripheral Pulses, No clubbing, cyanosis, or edema  SKIN: warm dry                          12.5   6.58  )-----------( 258      ( 03 Jan 2022 06:31 )             37.9     01-03    140  |  109<H>  |  25<H>  ----------------------------<  163<H>  3.5   |  25  |  0.69    Ca    8.8      03 Jan 2022 06:31  Phos  3.8     01-03  Mg     2.1     01-03                I&O's Summary          CAPILLARY BLOOD GLUCOSE      RADIOLOGY & ADDITIONAL TESTS:

## 2022-01-03 NOTE — PROGRESS NOTE ADULT - ASSESSMENT
Patient is a 71 y.o. F, ambulates independently, from [facility], w/ PMHx of Alzheimer's' dementia, hx of CVA, HTN, DM, MDD, and recurrent falls, was sent for being restless and combative, and hitting and kicking nursing home staff. Admitted for dementia and agitation though patient is currently calm and cooperative at [facility].

## 2022-01-03 NOTE — PROGRESS NOTE ADULT - PROBLEM SELECTOR PLAN 4
- Chronic as per the patient  - EKG = sinus tachycardia, left deviation, ST changes at V6, T wave inversion in I and aVL, no active ischemia  - Troponin - neg - Chronic as per the patient  - EKG = sinus tachycardia, left deviation, ST changes at V6, T wave inversion in I and aVL, no active ischemia  - Troponin - neg  - resolved

## 2022-01-03 NOTE — PROGRESS NOTE ADULT - PROBLEM SELECTOR PLAN 3
- 2 days history of SOB  - saturating well on room air  - S/p COVID infection 11/16/21. Takes ASA, Vit C. Incentive spirometer  - Currently COVID negative  - CXR - neg - 2 days history of SOB  - saturating well on room air  - S/p COVID infection 11/16/21. Takes ASA, Vit C. Incentive spirometer  - Currently COVID negative  - CXR - neg  - resolved

## 2022-01-03 NOTE — PROGRESS NOTE ADULT - PROBLEM SELECTOR PLAN 2
- increased forgetfulness   - most likely 2/2 dementia worsening vs infectious etiology vs metabolic  - xray chest , procal - neg  -Off abx  - BCx - ngtd  - monitor bp  - currently stable and cooperative - increased forgetfulness   - most likely 2/2 dementia worsening vs infectious etiology vs metabolic  - xray chest , procal - neg  - Off abx  - BCx - ngtd  - monitor bp  - currently stable and cooperative  - resolved

## 2022-01-04 LAB
ANION GAP SERPL CALC-SCNC: 7 MMOL/L — SIGNIFICANT CHANGE UP (ref 5–17)
BUN SERPL-MCNC: 22 MG/DL — HIGH (ref 7–18)
CALCIUM SERPL-MCNC: 9.2 MG/DL — SIGNIFICANT CHANGE UP (ref 8.4–10.5)
CHLORIDE SERPL-SCNC: 107 MMOL/L — SIGNIFICANT CHANGE UP (ref 96–108)
CO2 SERPL-SCNC: 27 MMOL/L — SIGNIFICANT CHANGE UP (ref 22–31)
CREAT SERPL-MCNC: 0.52 MG/DL — SIGNIFICANT CHANGE UP (ref 0.5–1.3)
GLUCOSE BLDC GLUCOMTR-MCNC: 151 MG/DL — HIGH (ref 70–99)
GLUCOSE BLDC GLUCOMTR-MCNC: 200 MG/DL — HIGH (ref 70–99)
GLUCOSE BLDC GLUCOMTR-MCNC: 281 MG/DL — HIGH (ref 70–99)
GLUCOSE BLDC GLUCOMTR-MCNC: 98 MG/DL — SIGNIFICANT CHANGE UP (ref 70–99)
GLUCOSE SERPL-MCNC: 102 MG/DL — HIGH (ref 70–99)
HCT VFR BLD CALC: 38.5 % — SIGNIFICANT CHANGE UP (ref 34.5–45)
HGB BLD-MCNC: 13 G/DL — SIGNIFICANT CHANGE UP (ref 11.5–15.5)
MAGNESIUM SERPL-MCNC: 2 MG/DL — SIGNIFICANT CHANGE UP (ref 1.6–2.6)
MCHC RBC-ENTMCNC: 29.5 PG — SIGNIFICANT CHANGE UP (ref 27–34)
MCHC RBC-ENTMCNC: 33.8 GM/DL — SIGNIFICANT CHANGE UP (ref 32–36)
MCV RBC AUTO: 87.3 FL — SIGNIFICANT CHANGE UP (ref 80–100)
NRBC # BLD: 0 /100 WBCS — SIGNIFICANT CHANGE UP (ref 0–0)
PHOSPHATE SERPL-MCNC: 3.8 MG/DL — SIGNIFICANT CHANGE UP (ref 2.5–4.5)
PLATELET # BLD AUTO: 265 K/UL — SIGNIFICANT CHANGE UP (ref 150–400)
POTASSIUM SERPL-MCNC: 3.5 MMOL/L — SIGNIFICANT CHANGE UP (ref 3.5–5.3)
POTASSIUM SERPL-SCNC: 3.5 MMOL/L — SIGNIFICANT CHANGE UP (ref 3.5–5.3)
RBC # BLD: 4.41 M/UL — SIGNIFICANT CHANGE UP (ref 3.8–5.2)
RBC # FLD: 14.7 % — HIGH (ref 10.3–14.5)
SODIUM SERPL-SCNC: 141 MMOL/L — SIGNIFICANT CHANGE UP (ref 135–145)
WBC # BLD: 6.7 K/UL — SIGNIFICANT CHANGE UP (ref 3.8–10.5)
WBC # FLD AUTO: 6.7 K/UL — SIGNIFICANT CHANGE UP (ref 3.8–10.5)

## 2022-01-04 PROCEDURE — 99239 HOSP IP/OBS DSCHRG MGMT >30: CPT

## 2022-01-04 RX ORDER — AMLODIPINE BESYLATE 2.5 MG/1
1 TABLET ORAL
Qty: 30 | Refills: 0
Start: 2022-01-04 | End: 2022-02-02

## 2022-01-04 RX ORDER — AMLODIPINE BESYLATE 2.5 MG/1
5 TABLET ORAL DAILY
Refills: 0 | Status: DISCONTINUED | OUTPATIENT
Start: 2022-01-04 | End: 2022-01-10

## 2022-01-04 RX ADMIN — Medication 81 MILLIGRAM(S): at 13:06

## 2022-01-04 RX ADMIN — Medication 7 UNIT(S): at 17:27

## 2022-01-04 RX ADMIN — Medication 3: at 17:26

## 2022-01-04 RX ADMIN — MEMANTINE HYDROCHLORIDE 5 MILLIGRAM(S): 10 TABLET ORAL at 12:59

## 2022-01-04 RX ADMIN — Medication 500 MILLIGRAM(S): at 14:06

## 2022-01-04 RX ADMIN — LOSARTAN POTASSIUM 100 MILLIGRAM(S): 100 TABLET, FILM COATED ORAL at 05:31

## 2022-01-04 RX ADMIN — Medication 1 DROP(S): at 05:31

## 2022-01-04 RX ADMIN — ENOXAPARIN SODIUM 40 MILLIGRAM(S): 100 INJECTION SUBCUTANEOUS at 12:59

## 2022-01-04 RX ADMIN — INSULIN GLARGINE 40 UNIT(S): 100 INJECTION, SOLUTION SUBCUTANEOUS at 21:50

## 2022-01-04 RX ADMIN — DORZOLAMIDE HYDROCHLORIDE TIMOLOL MALEATE 1 DROP(S): 20; 5 SOLUTION/ DROPS OPHTHALMIC at 05:31

## 2022-01-04 RX ADMIN — Medication 100 MILLIGRAM(S): at 05:31

## 2022-01-04 RX ADMIN — Medication 1 DROP(S): at 17:28

## 2022-01-04 RX ADMIN — DORZOLAMIDE HYDROCHLORIDE TIMOLOL MALEATE 1 DROP(S): 20; 5 SOLUTION/ DROPS OPHTHALMIC at 17:28

## 2022-01-04 NOTE — PROGRESS NOTE ADULT - PROBLEM SELECTOR PLAN 2
- increased forgetfulness   - most likely 2/2 dementia worsening vs infectious etiology vs metabolic  - xray chest , procal - neg  - Off abx  - BCx - ngtd  - monitor bp  - currently stable and cooperative  - resolved

## 2022-01-04 NOTE — PROGRESS NOTE ADULT - SUBJECTIVE AND OBJECTIVE BOX
PGY-1 Progress Note discussed with attending    PAGER #: [[phone]] TILL 5:00 PM  PLEASE CONTACT ON CALL TEAM:  - On Call Team (Please refer to Roxy) FROM 5:00 PM - 8:30PM  - Nightfloat Team FROM 8:30 -7:30 AM    CHIEF COMPLAINT & BRIEF HOSPITAL COURSE:      INTERVAL HPI/OVERNIGHT EVENTS:       REVIEW OF SYSTEMS:  CONSTITUTIONAL: No fever, weight loss, or fatigue  RESPIRATORY: No cough, wheezing, chills or hemoptysis; No shortness of breath  CARDIOVASCULAR: No chest pain, palpitations, dizziness, or leg swelling  GASTROINTESTINAL: No abdominal pain. No nausea, vomiting, or hematemesis; No diarrhea or constipation. No melena or hematochezia.  GENITOURINARY: No dysuria or hematuria, urinary frequency  NEUROLOGICAL: No headaches, memory loss, loss of strength, numbness, or tremors  SKIN: No itching, burning, rashes, or lesions     MEDICATIONS  (STANDING):  amLODIPine   Tablet 5 milliGRAM(s) Oral daily  ascorbic acid 500 milliGRAM(s) Oral daily  aspirin  chewable 81 milliGRAM(s) Oral daily  dorzolamide 2%/timolol 0.5% Ophthalmic Solution 1 Drop(s) Both EYES two times a day  enoxaparin Injectable 40 milliGRAM(s) SubCutaneous daily  influenza  Vaccine (HIGH DOSE) 0.7 milliLiter(s) IntraMuscular once  insulin glargine Injectable (LANTUS) 40 Unit(s) SubCutaneous at bedtime  insulin lispro (ADMELOG) corrective regimen sliding scale   SubCutaneous three times a day before meals  insulin lispro Injectable (ADMELOG) 7 Unit(s) SubCutaneous three times a day before meals  losartan 100 milliGRAM(s) Oral daily  memantine 5 milliGRAM(s) Oral daily  metoprolol succinate  milliGRAM(s) Oral daily  prednisoLONE acetate 1% Suspension 1 Drop(s) Both EYES two times a day  traZODone 50 milliGRAM(s) Oral at bedtime    MEDICATIONS  (PRN):  melatonin 3 milliGRAM(s) Oral at bedtime PRN Sleep  metoprolol tartrate Injectable 5 milliGRAM(s) IV Push every 6 hours PRN SBP >166      Vital Signs Last 24 Hrs  T(C): 36.5 (04 Jan 2022 05:08), Max: 36.8 (03 Jan 2022 22:00)  T(F): 97.7 (04 Jan 2022 05:08), Max: 98.2 (03 Jan 2022 22:00)  HR: 79 (04 Jan 2022 05:08) (79 - 90)  BP: 150/84 (04 Jan 2022 05:08) (150/84 - 162/85)  BP(mean): --  RR: 18 (04 Jan 2022 05:08) (18 - 18)  SpO2: 98% (04 Jan 2022 05:08) (92% - 98%)    PHYSICAL EXAMINATION:  GENERAL: NAD, well built  HEAD:  Atraumatic, Normocephalic  EYES:  conjunctiva and sclera clear  NECK: Supple, No JVD, Normal thyroid  CHEST/LUNG: Clear to auscultation. Clear to percussion bilaterally; No rales, rhonchi, wheezing, or rubs  HEART: Regular rate and rhythm; No murmurs, rubs, or gallops  ABDOMEN: Soft, Nontender, Nondistended; Bowel sounds present, no pain or masses on palpation  NERVOUS SYSTEM:  Alert & Oriented X3  : voiding well  EXTREMITIES:  2+ Peripheral Pulses, No clubbing, cyanosis, or edema  SKIN: warm dry                          13.0   6.70  )-----------( 265      ( 04 Jan 2022 06:07 )             38.5     01-04    141  |  107  |  22<H>  ----------------------------<  102<H>  3.5   |  27  |  0.52    Ca    9.2      04 Jan 2022 06:07  Phos  3.8     01-04  Mg     2.0     01-04                I&O's Summary          CAPILLARY BLOOD GLUCOSE      RADIOLOGY & ADDITIONAL TESTS:                   PGY-1 Progress Note discussed with attending    PAGER #: [[phone]] TILL 5:00 PM  PLEASE CONTACT ON CALL TEAM:  - On Call Team (Please refer to Roxy) FROM 5:00 PM - 8:30PM  - Nightfloat Team FROM 8:30 -7:30 AM    CHIEF COMPLAINT & BRIEF HOSPITAL COURSE:    71 y.o. F, ambulates independently, from [facility], w/ PMHx of Alzheimer's' dementia, hx of CVA, HTN, DM, MDD, and recurrent falls, was sent for being restless and combative, and hitting and kicking nursing home staff. Pt is alert and oriented x2. Patient is confused and does not remember fighting with the nursing home staff. PT recommended ESTELITA    INTERVAL HPI/OVERNIGHT EVENTS:     Patient was examined at bedside, AAOx3, stable, NAD. We spoke to her in [place] using an , Yodit (MRN: [id number]). She has mild confusion (she was speaking to someone on the phone despite it being turned off). She denies any complaints. Her blood pressure has been elevated at -160. She is taking metoprolol and losartan. Amlodipine was added. There are no other significant events overnight. Still awaiting ESTELITA.    REVIEW OF SYSTEMS:  CONSTITUTIONAL: No fever, weight loss, or fatigue  RESPIRATORY: No cough, wheezing, chills or hemoptysis; No shortness of breath  CARDIOVASCULAR: No chest pain, palpitations, dizziness, or leg swelling  GASTROINTESTINAL: No abdominal pain. No nausea, vomiting, or hematemesis; No diarrhea or constipation. No melena or hematochezia.  GENITOURINARY: No dysuria or hematuria, urinary frequency  NEUROLOGICAL: No headaches, memory loss, loss of strength, numbness, or tremors  SKIN: No itching, burning, rashes, or lesions     MEDICATIONS  (STANDING):  amLODIPine   Tablet 5 milliGRAM(s) Oral daily  ascorbic acid 500 milliGRAM(s) Oral daily  aspirin  chewable 81 milliGRAM(s) Oral daily  dorzolamide 2%/timolol 0.5% Ophthalmic Solution 1 Drop(s) Both EYES two times a day  enoxaparin Injectable 40 milliGRAM(s) SubCutaneous daily  influenza  Vaccine (HIGH DOSE) 0.7 milliLiter(s) IntraMuscular once  insulin glargine Injectable (LANTUS) 40 Unit(s) SubCutaneous at bedtime  insulin lispro (ADMELOG) corrective regimen sliding scale   SubCutaneous three times a day before meals  insulin lispro Injectable (ADMELOG) 7 Unit(s) SubCutaneous three times a day before meals  losartan 100 milliGRAM(s) Oral daily  memantine 5 milliGRAM(s) Oral daily  metoprolol succinate  milliGRAM(s) Oral daily  prednisoLONE acetate 1% Suspension 1 Drop(s) Both EYES two times a day  traZODone 50 milliGRAM(s) Oral at bedtime    MEDICATIONS  (PRN):  melatonin 3 milliGRAM(s) Oral at bedtime PRN Sleep  metoprolol tartrate Injectable 5 milliGRAM(s) IV Push every 6 hours PRN SBP >166      Vital Signs Last 24 Hrs  T(C): 36.5 (04 Jan 2022 05:08), Max: 36.8 (03 Jan 2022 22:00)  T(F): 97.7 (04 Jan 2022 05:08), Max: 98.2 (03 Jan 2022 22:00)  HR: 79 (04 Jan 2022 05:08) (79 - 90)  BP: 150/84 (04 Jan 2022 05:08) (150/84 - 162/85)  BP(mean): --  RR: 18 (04 Jan 2022 05:08) (18 - 18)  SpO2: 98% (04 Jan 2022 05:08) (92% - 98%)    PHYSICAL EXAMINATION:  GENERAL: NAD  HEAD:  Atraumatic, Normocephalic  EYES:  conjunctiva and sclera clear  NECK: Supple, No JVD, Normal thyroid  CHEST/LUNG: Clear to auscultation. Clear to percussion bilaterally; No rales, rhonchi, wheezing, or rubs  HEART: Regular rate and rhythm; No murmurs, rubs, or gallops  ABDOMEN: Soft, Nontender, Nondistended; Bowel sounds present, no pain or masses on palpation  NERVOUS SYSTEM:  Alert & Oriented X3  : voiding well  EXTREMITIES:  2+ Peripheral Pulses, No clubbing, cyanosis, or edema  SKIN: warm dry                          13.0   6.70  )-----------( 265      ( 04 Jan 2022 06:07 )             38.5     01-04    141  |  107  |  22<H>  ----------------------------<  102<H>  3.5   |  27  |  0.52    Ca    9.2      04 Jan 2022 06:07  Phos  3.8     01-04  Mg     2.0     01-04                I&O's Summary          CAPILLARY BLOOD GLUCOSE      RADIOLOGY & ADDITIONAL TESTS:

## 2022-01-04 NOTE — PROGRESS NOTE ADULT - PROBLEM SELECTOR PLAN 8
- Home meds Losartan, metoprolol  - c/w home meds - Home meds Losartan, metoprolol  - c/w home meds  - added amlodipine

## 2022-01-04 NOTE — PROGRESS NOTE ADULT - PROBLEM SELECTOR PLAN 4
- Chronic as per the patient  - EKG = sinus tachycardia, left deviation, ST changes at V6, T wave inversion in I and aVL, no active ischemia  - Troponin - neg  - resolved

## 2022-01-04 NOTE — PROGRESS NOTE ADULT - ASSESSMENT
Patient is a 71 y.o. F, ambulates independently, from [facility], w/ PMHx of Alzheimer's' dementia, hx of CVA, HTN, DM, MDD, and recurrent falls, was sent for being restless and combative, and hitting and kicking nursing home staff. Admitted for dementia and agitation though patient is currently calm and cooperative at [facility].

## 2022-01-04 NOTE — PROGRESS NOTE ADULT - PROBLEM SELECTOR PLAN 3
- 2 days history of SOB  - saturating well on room air  - S/p COVID infection 11/16/21. Takes ASA, Vit C. Incentive spirometer  - Currently COVID negative  - CXR - neg  - resolved

## 2022-01-05 LAB
ANION GAP SERPL CALC-SCNC: 5 MMOL/L — SIGNIFICANT CHANGE UP (ref 5–17)
BUN SERPL-MCNC: 23 MG/DL — HIGH (ref 7–18)
CALCIUM SERPL-MCNC: 9 MG/DL — SIGNIFICANT CHANGE UP (ref 8.4–10.5)
CHLORIDE SERPL-SCNC: 109 MMOL/L — HIGH (ref 96–108)
CO2 SERPL-SCNC: 26 MMOL/L — SIGNIFICANT CHANGE UP (ref 22–31)
CREAT SERPL-MCNC: 0.63 MG/DL — SIGNIFICANT CHANGE UP (ref 0.5–1.3)
GLUCOSE BLDC GLUCOMTR-MCNC: 108 MG/DL — HIGH (ref 70–99)
GLUCOSE BLDC GLUCOMTR-MCNC: 208 MG/DL — HIGH (ref 70–99)
GLUCOSE BLDC GLUCOMTR-MCNC: 244 MG/DL — HIGH (ref 70–99)
GLUCOSE BLDC GLUCOMTR-MCNC: 90 MG/DL — SIGNIFICANT CHANGE UP (ref 70–99)
GLUCOSE SERPL-MCNC: 97 MG/DL — SIGNIFICANT CHANGE UP (ref 70–99)
HCT VFR BLD CALC: 37.8 % — SIGNIFICANT CHANGE UP (ref 34.5–45)
HGB BLD-MCNC: 12.9 G/DL — SIGNIFICANT CHANGE UP (ref 11.5–15.5)
MAGNESIUM SERPL-MCNC: 1.9 MG/DL — SIGNIFICANT CHANGE UP (ref 1.6–2.6)
MCHC RBC-ENTMCNC: 30 PG — SIGNIFICANT CHANGE UP (ref 27–34)
MCHC RBC-ENTMCNC: 34.1 GM/DL — SIGNIFICANT CHANGE UP (ref 32–36)
MCV RBC AUTO: 87.9 FL — SIGNIFICANT CHANGE UP (ref 80–100)
NRBC # BLD: 0 /100 WBCS — SIGNIFICANT CHANGE UP (ref 0–0)
PHOSPHATE SERPL-MCNC: 3.9 MG/DL — SIGNIFICANT CHANGE UP (ref 2.5–4.5)
PLATELET # BLD AUTO: 256 K/UL — SIGNIFICANT CHANGE UP (ref 150–400)
POTASSIUM SERPL-MCNC: 3.6 MMOL/L — SIGNIFICANT CHANGE UP (ref 3.5–5.3)
POTASSIUM SERPL-SCNC: 3.6 MMOL/L — SIGNIFICANT CHANGE UP (ref 3.5–5.3)
RBC # BLD: 4.3 M/UL — SIGNIFICANT CHANGE UP (ref 3.8–5.2)
RBC # FLD: 14.9 % — HIGH (ref 10.3–14.5)
SODIUM SERPL-SCNC: 140 MMOL/L — SIGNIFICANT CHANGE UP (ref 135–145)
WBC # BLD: 6.39 K/UL — SIGNIFICANT CHANGE UP (ref 3.8–10.5)
WBC # FLD AUTO: 6.39 K/UL — SIGNIFICANT CHANGE UP (ref 3.8–10.5)

## 2022-01-05 PROCEDURE — 99232 SBSQ HOSP IP/OBS MODERATE 35: CPT

## 2022-01-05 RX ADMIN — AMLODIPINE BESYLATE 5 MILLIGRAM(S): 2.5 TABLET ORAL at 06:01

## 2022-01-05 RX ADMIN — DORZOLAMIDE HYDROCHLORIDE TIMOLOL MALEATE 1 DROP(S): 20; 5 SOLUTION/ DROPS OPHTHALMIC at 18:04

## 2022-01-05 RX ADMIN — Medication 2: at 16:54

## 2022-01-05 RX ADMIN — Medication 50 MILLIGRAM(S): at 21:49

## 2022-01-05 RX ADMIN — Medication 500 MILLIGRAM(S): at 11:44

## 2022-01-05 RX ADMIN — Medication 2: at 11:43

## 2022-01-05 RX ADMIN — Medication 81 MILLIGRAM(S): at 11:44

## 2022-01-05 RX ADMIN — DORZOLAMIDE HYDROCHLORIDE TIMOLOL MALEATE 1 DROP(S): 20; 5 SOLUTION/ DROPS OPHTHALMIC at 06:01

## 2022-01-05 RX ADMIN — Medication 100 MILLIGRAM(S): at 06:01

## 2022-01-05 RX ADMIN — INSULIN GLARGINE 40 UNIT(S): 100 INJECTION, SOLUTION SUBCUTANEOUS at 21:49

## 2022-01-05 RX ADMIN — ENOXAPARIN SODIUM 40 MILLIGRAM(S): 100 INJECTION SUBCUTANEOUS at 11:44

## 2022-01-05 RX ADMIN — MEMANTINE HYDROCHLORIDE 5 MILLIGRAM(S): 10 TABLET ORAL at 11:44

## 2022-01-05 RX ADMIN — LOSARTAN POTASSIUM 100 MILLIGRAM(S): 100 TABLET, FILM COATED ORAL at 06:01

## 2022-01-05 RX ADMIN — Medication 7 UNIT(S): at 16:54

## 2022-01-05 RX ADMIN — Medication 7 UNIT(S): at 11:43

## 2022-01-05 RX ADMIN — Medication 1 DROP(S): at 06:02

## 2022-01-05 RX ADMIN — Medication 1 DROP(S): at 18:03

## 2022-01-05 NOTE — DIETITIAN INITIAL EVALUATION ADULT. - PROBLEM SELECTOR PLAN 1
- Fighting with staff at NH but does not remember. Likely dementia + sundowning  - Home meds Memantine  - C/w home meds  - Neuro consult in AM, then please consider psych consult.  - SW consulted for placement

## 2022-01-05 NOTE — DIETITIAN INITIAL EVALUATION ADULT. - OTHER INFO
Patient from [facility] & admitted for restless & combative behavior at NH. Visited pt. out of bed in chair, alert & [place] citizen speaking, offered  phone but refusing, observed lunch tray at bedside, intake ~45% noted, d/w nursing staff, at times pt. "resistance to care", usual po intake fair, needs encouragement, rec. "softer meals", having difficulties with chewing "hard vegetables" noted, dosing wt. 143.6 Lbs on 1/5, per flow sheet intake, 51-75%, pending placement at [facility]/NH, skin intact, no edema.

## 2022-01-05 NOTE — DIETITIAN INITIAL EVALUATION ADULT. - PERTINENT LABORATORY DATA
01-05 Na140 mmol/L Glu 97 mg/dL K+ 3.6 mmol/L Cr  0.63 mg/dL BUN 23 mg/dL<H>   01-05 Phos 3.9 mg/dL          01-01-22 @ 10:29 HgbA1C 10.7

## 2022-01-05 NOTE — DIETITIAN INITIAL EVALUATION ADULT. - PROBLEM SELECTOR PLAN 4
- Chronic as per the patient  - EKG = sinus tachycardia, left deviation, ST changes at V6, T wave inversion in I and aVL, no active ischemia  - f/u Troponin

## 2022-01-05 NOTE — DIETITIAN INITIAL EVALUATION ADULT. - FACTORS AFF FOOD INTAKE
AMS, agitation, Dementia, Alzheimer's, h/o CVA, HTN, type 2 diabetes, recurring falls/change in mental status/difficulty chewing/difficulty with food procurement/preparation/[employer]/ethnic/cultural/personal food preferences/other (specify)

## 2022-01-05 NOTE — CHART NOTE - NSCHARTNOTEFT_GEN_A_CORE
Pt's daughter Ami [phone] to update her regarding pt's current medical condition. Left VM for her to call us back.

## 2022-01-05 NOTE — DIETITIAN INITIAL EVALUATION ADULT. - PERTINENT MEDS FT
MEDICATIONS  (STANDING):  amLODIPine   Tablet 5 milliGRAM(s) Oral daily  ascorbic acid 500 milliGRAM(s) Oral daily  aspirin  chewable 81 milliGRAM(s) Oral daily  dorzolamide 2%/timolol 0.5% Ophthalmic Solution 1 Drop(s) Both EYES two times a day  enoxaparin Injectable 40 milliGRAM(s) SubCutaneous daily  influenza  Vaccine (HIGH DOSE) 0.7 milliLiter(s) IntraMuscular once  insulin glargine Injectable (LANTUS) 40 Unit(s) SubCutaneous at bedtime  insulin lispro (ADMELOG) corrective regimen sliding scale   SubCutaneous three times a day before meals  insulin lispro Injectable (ADMELOG) 7 Unit(s) SubCutaneous three times a day before meals  losartan 100 milliGRAM(s) Oral daily  memantine 5 milliGRAM(s) Oral daily  metoprolol succinate  milliGRAM(s) Oral daily  prednisoLONE acetate 1% Suspension 1 Drop(s) Both EYES two times a day  traZODone 50 milliGRAM(s) Oral at bedtime    MEDICATIONS  (PRN):  melatonin 3 milliGRAM(s) Oral at bedtime PRN Sleep  metoprolol tartrate Injectable 5 milliGRAM(s) IV Push every 6 hours PRN SBP >166

## 2022-01-05 NOTE — DIETITIAN INITIAL EVALUATION ADULT. - PROBLEM SELECTOR PLAN 2
- increased forgetfulness   - most likely 2/2 dementia worsening vs infectious etiology vs metabolic  - f/y xray chest , procal   - for now will start azithro, ceftriaxone   - f/u BCx  - monitor bp

## 2022-01-05 NOTE — PROGRESS NOTE ADULT - ASSESSMENT
Patient is a 71 y.o. F, ambulates independently, from [facility], w/ PMHx of Alzheimer's' dementia, hx of CVA, HTN, DM, MDD, and recurrent falls, was sent for being restless and combative, and hitting and kicking nursing home staff. Admitted for dementia and agitation though patient is currently calm and cooperative at [facility]. Patient is a 71 y.o. F, ambulates independently, from [facility], w/ PMHx of Alzheimer's' dementia, hx of CVA, HTN, DM, MDD, and recurrent falls, was sent for being restless and combative, and hitting and kicking nursing home staff. Admitted for dementia and agitation though patient is currently calm and cooperative at [facility].    1/5/22- Pt evaluated via [employer]  [employer] [id number]  Pt is alert and oriented at time of my eval and denies all medical complaints.  Pt is calm and cooperative, taking her medication as ordered.  No reports of verbal or physical aggression.

## 2022-01-05 NOTE — PROGRESS NOTE ADULT - SUBJECTIVE AND OBJECTIVE BOX
NP Note discussed with  Primary Attending    Patient is a 71y old  Female who presents with a chief complaint of Agitation + Dementia (05 Jan 2022 11:00)      INTERVAL HPI/OVERNIGHT EVENTS: no new complaints-     MEDICATIONS  (STANDING):  amLODIPine   Tablet 5 milliGRAM(s) Oral daily  ascorbic acid 500 milliGRAM(s) Oral daily  aspirin  chewable 81 milliGRAM(s) Oral daily  dorzolamide 2%/timolol 0.5% Ophthalmic Solution 1 Drop(s) Both EYES two times a day  enoxaparin Injectable 40 milliGRAM(s) SubCutaneous daily  influenza  Vaccine (HIGH DOSE) 0.7 milliLiter(s) IntraMuscular once  insulin glargine Injectable (LANTUS) 40 Unit(s) SubCutaneous at bedtime  insulin lispro (ADMELOG) corrective regimen sliding scale   SubCutaneous three times a day before meals  insulin lispro Injectable (ADMELOG) 7 Unit(s) SubCutaneous three times a day before meals  losartan 100 milliGRAM(s) Oral daily  memantine 5 milliGRAM(s) Oral daily  metoprolol succinate  milliGRAM(s) Oral daily  prednisoLONE acetate 1% Suspension 1 Drop(s) Both EYES two times a day  traZODone 50 milliGRAM(s) Oral at bedtime    MEDICATIONS  (PRN):  melatonin 3 milliGRAM(s) Oral at bedtime PRN Sleep  metoprolol tartrate Injectable 5 milliGRAM(s) IV Push every 6 hours PRN SBP >166      __________________________________________________  REVIEW OF SYSTEMS:    CONSTITUTIONAL: No fever,   EYES: no acute visual disturbances  NECK: No pain or stiffness  RESPIRATORY: No cough; No shortness of breath  CARDIOVASCULAR: No chest pain, no palpitations  GASTROINTESTINAL: No pain. No nausea or vomiting; No diarrhea   NEUROLOGICAL: No headache or numbness, no tremors  MUSCULOSKELETAL: No joint pain, no muscle pain  GENITOURINARY: no dysuria, no frequency, no hesitancy  PSYCHIATRY: no depression , no anxiety  ALL OTHER  ROS negative        Vital Signs Last 24 Hrs  T(C): 36.7 (05 Jan 2022 13:55), Max: 36.7 (04 Jan 2022 20:35)  T(F): 98 (05 Jan 2022 13:55), Max: 98.1 (04 Jan 2022 20:35)  HR: 91 (05 Jan 2022 13:55) (82 - 96)  BP: 135/76 (05 Jan 2022 13:55) (135/76 - 168/89)  BP(mean): --  RR: 18 (05 Jan 2022 13:55) (18 - 18)  SpO2: 96% (05 Jan 2022 13:55) (95% - 99%)    ________________________________________________  PHYSICAL EXAM:  GENERAL: NAD  HEENT: Normocephalic;  conjunctivae and sclerae clear; moist mucous membranes;   NECK : supple  CHEST/LUNG: Clear to auscultation bilaterally with good air entry   HEART: S1 S2  regular; no murmurs, gallops or rubs  ABDOMEN: Soft, Nontender, Nondistended; Bowel sounds present  EXTREMITIES: no cyanosis; no edema; no calf tenderness  SKIN: warm and dry; no rash  NERVOUS SYSTEM:  Awake and alert; Oriented  to place, person and time ; no new deficits    _________________________________________________  LABS:                        12.9   6.39  )-----------( 256      ( 05 Jan 2022 06:24 )             37.8     01-05    140  |  109<H>  |  23<H>  ----------------------------<  97  3.6   |  26  |  0.63    Ca    9.0      05 Jan 2022 06:24  Phos  3.9     01-05  Mg     1.9     01-05          CAPILLARY BLOOD GLUCOSE      POCT Blood Glucose.: 244 mg/dL (05 Jan 2022 16:28)  POCT Blood Glucose.: 208 mg/dL (05 Jan 2022 11:24)  POCT Blood Glucose.: 90 mg/dL (05 Jan 2022 07:46)  POCT Blood Glucose.: 200 mg/dL (04 Jan 2022 21:04)        RADIOLOGY & ADDITIONAL TESTS:    Imaging  Reviewed:  YES/NO    Consultant(s) Notes Reviewed:   YES/ No      Plan of care was discussed with patient and /or primary care giver; all questions and concerns were addressed  NP Note discussed with  Primary Attending    Patient is a 71y old  Female who presents with a chief complaint of Agitation + Dementia (05 Jan 2022 11:00)      INTERVAL HPI/OVERNIGHT EVENTS: no new complaints- evaluated via [place]  Air[employer] [phone]    MEDICATIONS  (STANDING):  amLODIPine   Tablet 5 milliGRAM(s) Oral daily  ascorbic acid 500 milliGRAM(s) Oral daily  aspirin  chewable 81 milliGRAM(s) Oral daily  dorzolamide 2%/timolol 0.5% Ophthalmic Solution 1 Drop(s) Both EYES two times a day  enoxaparin Injectable 40 milliGRAM(s) SubCutaneous daily  influenza  Vaccine (HIGH DOSE) 0.7 milliLiter(s) IntraMuscular once  insulin glargine Injectable (LANTUS) 40 Unit(s) SubCutaneous at bedtime  insulin lispro (ADMELOG) corrective regimen sliding scale   SubCutaneous three times a day before meals  insulin lispro Injectable (ADMELOG) 7 Unit(s) SubCutaneous three times a day before meals  losartan 100 milliGRAM(s) Oral daily  memantine 5 milliGRAM(s) Oral daily  metoprolol succinate  milliGRAM(s) Oral daily  prednisoLONE acetate 1% Suspension 1 Drop(s) Both EYES two times a day  traZODone 50 milliGRAM(s) Oral at bedtime    MEDICATIONS  (PRN):  melatonin 3 milliGRAM(s) Oral at bedtime PRN Sleep  metoprolol tartrate Injectable 5 milliGRAM(s) IV Push every 6 hours PRN SBP >166      __________________________________________________  REVIEW OF SYSTEMS:    CONSTITUTIONAL: No fever,   EYES: no acute visual disturbances  NECK: No pain or stiffness  RESPIRATORY: No cough; No shortness of breath  CARDIOVASCULAR: No chest pain, no palpitations  GASTROINTESTINAL: No pain. No nausea or vomiting; No diarrhea   NEUROLOGICAL: No headache or numbness, no tremors  MUSCULOSKELETAL: No joint pain, no muscle pain  GENITOURINARY: no dysuria, no frequency, no hesitancy  PSYCHIATRY: no depression , no anxiety  ALL OTHER  ROS negative        Vital Signs Last 24 Hrs  T(C): 36.7 (05 Jan 2022 13:55), Max: 36.7 (04 Jan 2022 20:35)  T(F): 98 (05 Jan 2022 13:55), Max: 98.1 (04 Jan 2022 20:35)  HR: 91 (05 Jan 2022 13:55) (82 - 96)  BP: 135/76 (05 Jan 2022 13:55) (135/76 - 168/89)  BP(mean): --  RR: 18 (05 Jan 2022 13:55) (18 - 18)  SpO2: 96% (05 Jan 2022 13:55) (95% - 99%)    ________________________________________________  PHYSICAL EXAM:  GENERAL: NAD  HEENT: Normocephalic;  conjunctivae and sclerae clear; moist mucous membranes;   NECK : supple  CHEST/LUNG: Clear to auscultation bilaterally with good air entry   HEART: S1 S2  regular; no murmurs, gallops or rubs  ABDOMEN: Soft, Nontender, Nondistended; Bowel sounds present  EXTREMITIES: no cyanosis; no edema; no calf tenderness  SKIN: warm and dry; no rash  NERVOUS SYSTEM:  Awake and alert; Oriented  to place, person and time ; no new deficits    _________________________________________________  LABS:                        12.9   6.39  )-----------( 256      ( 05 Jan 2022 06:24 )             37.8     01-05    140  |  109<H>  |  23<H>  ----------------------------<  97  3.6   |  26  |  0.63    Ca    9.0      05 Jan 2022 06:24  Phos  3.9     01-05  Mg     1.9     01-05          CAPILLARY BLOOD GLUCOSE      POCT Blood Glucose.: 244 mg/dL (05 Jan 2022 16:28)  POCT Blood Glucose.: 208 mg/dL (05 Jan 2022 11:24)  POCT Blood Glucose.: 90 mg/dL (05 Jan 2022 07:46)  POCT Blood Glucose.: 200 mg/dL (04 Jan 2022 21:04)        RADIOLOGY & ADDITIONAL TESTS:    Imaging  Reviewed:  YES/NO    Consultant(s) Notes Reviewed:   YES/ No      Plan of care was discussed with patient and /or primary care giver; all questions and concerns were addressed

## 2022-01-06 LAB
GLUCOSE BLDC GLUCOMTR-MCNC: 103 MG/DL — HIGH (ref 70–99)
GLUCOSE BLDC GLUCOMTR-MCNC: 218 MG/DL — HIGH (ref 70–99)
GLUCOSE BLDC GLUCOMTR-MCNC: 234 MG/DL — HIGH (ref 70–99)
GLUCOSE BLDC GLUCOMTR-MCNC: 322 MG/DL — HIGH (ref 70–99)
GLUCOSE BLDC GLUCOMTR-MCNC: 81 MG/DL — SIGNIFICANT CHANGE UP (ref 70–99)

## 2022-01-06 PROCEDURE — 99232 SBSQ HOSP IP/OBS MODERATE 35: CPT

## 2022-01-06 RX ADMIN — Medication 500 MILLIGRAM(S): at 12:22

## 2022-01-06 RX ADMIN — Medication 81 MILLIGRAM(S): at 12:21

## 2022-01-06 RX ADMIN — AMLODIPINE BESYLATE 5 MILLIGRAM(S): 2.5 TABLET ORAL at 05:53

## 2022-01-06 RX ADMIN — Medication 7 UNIT(S): at 12:16

## 2022-01-06 RX ADMIN — Medication 7 UNIT(S): at 08:16

## 2022-01-06 RX ADMIN — Medication 2: at 17:11

## 2022-01-06 RX ADMIN — DORZOLAMIDE HYDROCHLORIDE TIMOLOL MALEATE 1 DROP(S): 20; 5 SOLUTION/ DROPS OPHTHALMIC at 05:54

## 2022-01-06 RX ADMIN — DORZOLAMIDE HYDROCHLORIDE TIMOLOL MALEATE 1 DROP(S): 20; 5 SOLUTION/ DROPS OPHTHALMIC at 17:10

## 2022-01-06 RX ADMIN — Medication 7 UNIT(S): at 17:11

## 2022-01-06 RX ADMIN — INSULIN GLARGINE 40 UNIT(S): 100 INJECTION, SOLUTION SUBCUTANEOUS at 21:37

## 2022-01-06 RX ADMIN — Medication 1 DROP(S): at 17:10

## 2022-01-06 RX ADMIN — MEMANTINE HYDROCHLORIDE 5 MILLIGRAM(S): 10 TABLET ORAL at 12:21

## 2022-01-06 RX ADMIN — LOSARTAN POTASSIUM 100 MILLIGRAM(S): 100 TABLET, FILM COATED ORAL at 05:53

## 2022-01-06 RX ADMIN — Medication 1 DROP(S): at 05:54

## 2022-01-06 RX ADMIN — Medication 100 MILLIGRAM(S): at 05:53

## 2022-01-06 RX ADMIN — ENOXAPARIN SODIUM 40 MILLIGRAM(S): 100 INJECTION SUBCUTANEOUS at 12:21

## 2022-01-06 RX ADMIN — Medication 50 MILLIGRAM(S): at 21:37

## 2022-01-06 NOTE — PROGRESS NOTE ADULT - PROBLEM SELECTOR PLAN 7
- Home lantus 30U, admelog 10U tid.  - Glucose still elevated  - HbA1c 12.1 in 11/23/2021  - Insulin ss (moderate)  - Increase lantus to 40u HS and admelog 12U AC  - FS achs - Home lantus 30U, admelog 10U tid.  - HbA1c 10.7 1/22  - continue Insulin ss (moderate)  - continue lantus to 40u HS and admelog 12U AC  - glucose well controlled  - FS achs

## 2022-01-06 NOTE — PROGRESS NOTE ADULT - ASSESSMENT
72 y/o female, ambulates independently, from [facility], w/ PMHx of Alzheimer's' dementia, hx of CVA, HTN, DM, MDD, and recurrent falls, was sent for being restless and combative, and hitting and kicking nursing home staff. Admitted for dementia and agitation though patient is currently calm and cooperative at [facility].    1/6/22-  used, Jana #[id number]   Pt is aox3 (name, place, time), no signs of agitated behavior or aggression. Pt is behaving calm and taking all her medications. Pt is updated on current status of finding her rehab.

## 2022-01-06 NOTE — PROGRESS NOTE ADULT - SUBJECTIVE AND OBJECTIVE BOX
Patient is a 71y old  Female who presents with a chief complaint of Agitation +Dementia (05 Jan 2022 18:13)    OVERNIGHT EVENTS: no acute changes    REVIEW OF SYSTEMS:  CONSTITUTIONAL: No fever, chills  ENMT:  No difficulty hearing  NECK: No pain or stiffness  RESPIRATORY: No cough, SOB  CARDIOVASCULAR: No chest pain, palpitations  GASTROINTESTINAL: No abdominal pain. No nausea, vomiting, or diarrhea  GENITOURINARY: No dysuria  NEUROLOGICAL: No HA  SKIN: No itching, burning, rashes, or lesions   MUSCULOSKELETAL: No joint pain or swelling; No muscle, back, or extremity pain    T(C): 36.6 (01-06-22 @ 05:16), Max: 36.7 (01-05-22 @ 13:55)  HR: 90 (01-06-22 @ 05:16) (89 - 91)  BP: 145/72 (01-06-22 @ 05:16) (135/76 - 161/92)  RR: 16 (01-06-22 @ 05:16) (16 - 18)  SpO2: 98% (01-06-22 @ 05:16) (96% - 98%)  Wt(kg): --Vital Signs Last 24 Hrs  T(C): 36.6 (06 Jan 2022 05:16), Max: 36.7 (05 Jan 2022 13:55)  T(F): 97.8 (06 Jan 2022 05:16), Max: 98 (05 Jan 2022 13:55)  HR: 90 (06 Jan 2022 05:16) (89 - 91)  BP: 145/72 (06 Jan 2022 05:16) (135/76 - 161/92)  BP(mean): --  RR: 16 (06 Jan 2022 05:16) (16 - 18)  SpO2: 98% (06 Jan 2022 05:16) (96% - 98%)    MEDICATIONS  (STANDING):  amLODIPine   Tablet 5 milliGRAM(s) Oral daily  ascorbic acid 500 milliGRAM(s) Oral daily  aspirin  chewable 81 milliGRAM(s) Oral daily  dorzolamide 2%/timolol 0.5% Ophthalmic Solution 1 Drop(s) Both EYES two times a day  enoxaparin Injectable 40 milliGRAM(s) SubCutaneous daily  influenza  Vaccine (HIGH DOSE) 0.7 milliLiter(s) IntraMuscular once  insulin glargine Injectable (LANTUS) 40 Unit(s) SubCutaneous at bedtime  insulin lispro (ADMELOG) corrective regimen sliding scale   SubCutaneous three times a day before meals  insulin lispro Injectable (ADMELOG) 7 Unit(s) SubCutaneous three times a day before meals  losartan 100 milliGRAM(s) Oral daily  memantine 5 milliGRAM(s) Oral daily  metoprolol succinate  milliGRAM(s) Oral daily  prednisoLONE acetate 1% Suspension 1 Drop(s) Both EYES two times a day  traZODone 50 milliGRAM(s) Oral at bedtime    MEDICATIONS  (PRN):  melatonin 3 milliGRAM(s) Oral at bedtime PRN Sleep  metoprolol tartrate Injectable 5 milliGRAM(s) IV Push every 6 hours PRN SBP >166    PHYSICAL EXAM:  GENERAL: NAD  EYES: clear conjunctiva; EOMI  ENMT: Moist mucous membranes  NECK: Supple, No JVD, Normal thyroid  CHEST/LUNG: Clear to auscultation bilaterally; No rales, rhonchi, wheezing, or rubs  HEART: S1, S2, Regular rate and rhythm  ABDOMEN: Soft, Nontender, Nondistended; Bowel sounds present  NEURO: Alert & Oriented X3  EXTREMITIES: No LE edema, no calf tenderness  LYMPH: No lymphadenopathy noted  SKIN: No rashes or lesions    Consultant(s) Notes Reviewed:  [x ] YES  [ ] NO  Care Discussed with Consultants/Other Providers [ x] YES  [ ] NO    LABS:                        12.9   6.39  )-----------( 256      ( 05 Jan 2022 06:24 )             37.8     01-05    140  |  109<H>  |  23<H>  ----------------------------<  97  3.6   |  26  |  0.63    Ca    9.0      05 Jan 2022 06:24  Phos  3.9     01-05  Mg     1.9     01-05        CAPILLARY BLOOD GLUCOSE      POCT Blood Glucose.: 103 mg/dL (06 Jan 2022 11:18)  POCT Blood Glucose.: 81 mg/dL (06 Jan 2022 07:46)  POCT Blood Glucose.: 108 mg/dL (05 Jan 2022 21:39)  POCT Blood Glucose.: 244 mg/dL (05 Jan 2022 16:28)            RADIOLOGY & ADDITIONAL TESTS:    Imaging Personally Reviewed:  [ ] YES  [ ] NO   Patient is a 71y old  Female who presents with a chief complaint of Agitation +Dementia (05 Jan 2022 18:13)    OVERNIGHT EVENTS: no acute changes    REVIEW OF SYSTEMS:  CONSTITUTIONAL: No fever, chills  ENMT:  No difficulty hearing  NECK: No pain or stiffness  RESPIRATORY: No cough, SOB  CARDIOVASCULAR: No chest pain, palpitations  GASTROINTESTINAL: No abdominal pain. No nausea, vomiting, or diarrhea  GENITOURINARY: No dysuria  NEUROLOGICAL: No HA  SKIN: No itching, burning, rashes, or lesions   MUSCULOSKELETAL: No joint pain or swelling; No muscle, back, or extremity pain    T(C): 36.6 (01-06-22 @ 05:16), Max: 36.7 (01-05-22 @ 13:55)  HR: 90 (01-06-22 @ 05:16) (89 - 91)  BP: 145/72 (01-06-22 @ 05:16) (135/76 - 161/92)  RR: 16 (01-06-22 @ 05:16) (16 - 18)  SpO2: 98% (01-06-22 @ 05:16) (96% - 98%)  Wt(kg): --Vital Signs Last 24 Hrs  T(C): 36.6 (06 Jan 2022 05:16), Max: 36.7 (05 Jan 2022 13:55)  T(F): 97.8 (06 Jan 2022 05:16), Max: 98 (05 Jan 2022 13:55)  HR: 90 (06 Jan 2022 05:16) (89 - 91)  BP: 145/72 (06 Jan 2022 05:16) (135/76 - 161/92)  BP(mean): --  RR: 16 (06 Jan 2022 05:16) (16 - 18)  SpO2: 98% (06 Jan 2022 05:16) (96% - 98%)    MEDICATIONS  (STANDING):  amLODIPine   Tablet 5 milliGRAM(s) Oral daily  ascorbic acid 500 milliGRAM(s) Oral daily  aspirin  chewable 81 milliGRAM(s) Oral daily  dorzolamide 2%/timolol 0.5% Ophthalmic Solution 1 Drop(s) Both EYES two times a day  enoxaparin Injectable 40 milliGRAM(s) SubCutaneous daily  influenza  Vaccine (HIGH DOSE) 0.7 milliLiter(s) IntraMuscular once  insulin glargine Injectable (LANTUS) 40 Unit(s) SubCutaneous at bedtime  insulin lispro (ADMELOG) corrective regimen sliding scale   SubCutaneous three times a day before meals  insulin lispro Injectable (ADMELOG) 7 Unit(s) SubCutaneous three times a day before meals  losartan 100 milliGRAM(s) Oral daily  memantine 5 milliGRAM(s) Oral daily  metoprolol succinate  milliGRAM(s) Oral daily  prednisoLONE acetate 1% Suspension 1 Drop(s) Both EYES two times a day  traZODone 50 milliGRAM(s) Oral at bedtime    MEDICATIONS  (PRN):  melatonin 3 milliGRAM(s) Oral at bedtime PRN Sleep  metoprolol tartrate Injectable 5 milliGRAM(s) IV Push every 6 hours PRN SBP >166    PHYSICAL EXAM:  GENERAL: NAD  EYES: clear conjunctiva  ENMT: Moist mucous membranes  NECK: Supple, No JVD  CHEST/LUNG: Clear to auscultation bilaterally; No rales, rhonchi, wheezing, or rubs  HEART: S1, S2, Regular rate and rhythm  ABDOMEN: Soft, Nontender, Nondistended; Bowel sounds present  NEURO: Alert & Oriented X3 (name, place, time)  EXTREMITIES: No LE edema, no calf tenderness  SKIN: No rashes or lesions    Consultant(s) Notes Reviewed:  [x ] YES  [ ] NO  Care Discussed with Consultants/Other Providers [ x] YES  [ ] NO    LABS:                        12.9   6.39  )-----------( 256      ( 05 Jan 2022 06:24 )             37.8     01-05    140  |  109<H>  |  23<H>  ----------------------------<  97  3.6   |  26  |  0.63    Ca    9.0      05 Jan 2022 06:24  Phos  3.9     01-05  Mg     1.9     01-05        CAPILLARY BLOOD GLUCOSE  POCT Blood Glucose.: 103 mg/dL (06 Jan 2022 11:18)  POCT Blood Glucose.: 81 mg/dL (06 Jan 2022 07:46)  POCT Blood Glucose.: 108 mg/dL (05 Jan 2022 21:39)  POCT Blood Glucose.: 244 mg/dL (05 Jan 2022 16:28)    RADIOLOGY & ADDITIONAL TESTS:  no new tests

## 2022-01-06 NOTE — PROGRESS NOTE ADULT - PROBLEM SELECTOR PLAN 8
- Home meds Losartan, metoprolol  - c/w home meds  - added amlodipine - Home meds Losartan, metoprolol  - c/w home meds  - continue amlodipine 5 mg  - moderately controlled  - continue dash diet  - monitor BP - Home meds Losartan, metoprolol  - c/w home meds  - continue amlodipine 5 mg  - start hydrochlorothiazide 12.5 mg  - moderately controlled  - continue dash diet  - monitor BP

## 2022-01-06 NOTE — PROGRESS NOTE ADULT - PROBLEM SELECTOR PLAN 1
Pt p/w fighting with staff at NH but does not remember. Likely dementia + sundowning  - Home meds Memantine  - C/w home med  - SW consulted for placement  - currently stable and cooperative  - pending d/c to [facility]

## 2022-01-07 LAB
GLUCOSE BLDC GLUCOMTR-MCNC: 147 MG/DL — HIGH (ref 70–99)
GLUCOSE BLDC GLUCOMTR-MCNC: 165 MG/DL — HIGH (ref 70–99)
GLUCOSE BLDC GLUCOMTR-MCNC: 192 MG/DL — HIGH (ref 70–99)
GLUCOSE BLDC GLUCOMTR-MCNC: 82 MG/DL — SIGNIFICANT CHANGE UP (ref 70–99)
SARS-COV-2 RNA SPEC QL NAA+PROBE: SIGNIFICANT CHANGE UP

## 2022-01-07 PROCEDURE — 99232 SBSQ HOSP IP/OBS MODERATE 35: CPT

## 2022-01-07 RX ADMIN — MEMANTINE HYDROCHLORIDE 5 MILLIGRAM(S): 10 TABLET ORAL at 13:01

## 2022-01-07 RX ADMIN — Medication 500 MILLIGRAM(S): at 13:01

## 2022-01-07 RX ADMIN — ENOXAPARIN SODIUM 40 MILLIGRAM(S): 100 INJECTION SUBCUTANEOUS at 11:04

## 2022-01-07 RX ADMIN — DORZOLAMIDE HYDROCHLORIDE TIMOLOL MALEATE 1 DROP(S): 20; 5 SOLUTION/ DROPS OPHTHALMIC at 05:13

## 2022-01-07 RX ADMIN — Medication 1 DROP(S): at 05:13

## 2022-01-07 RX ADMIN — Medication 1 DROP(S): at 17:51

## 2022-01-07 RX ADMIN — INSULIN GLARGINE 40 UNIT(S): 100 INJECTION, SOLUTION SUBCUTANEOUS at 21:41

## 2022-01-07 RX ADMIN — DORZOLAMIDE HYDROCHLORIDE TIMOLOL MALEATE 1 DROP(S): 20; 5 SOLUTION/ DROPS OPHTHALMIC at 17:49

## 2022-01-07 RX ADMIN — AMLODIPINE BESYLATE 5 MILLIGRAM(S): 2.5 TABLET ORAL at 05:12

## 2022-01-07 RX ADMIN — LOSARTAN POTASSIUM 100 MILLIGRAM(S): 100 TABLET, FILM COATED ORAL at 05:13

## 2022-01-07 RX ADMIN — Medication 1: at 11:41

## 2022-01-07 RX ADMIN — Medication 81 MILLIGRAM(S): at 13:01

## 2022-01-07 RX ADMIN — Medication 100 MILLIGRAM(S): at 05:12

## 2022-01-07 RX ADMIN — Medication 50 MILLIGRAM(S): at 21:41

## 2022-01-07 NOTE — PROGRESS NOTE ADULT - ASSESSMENT
72 y/o female, ambulates independently, from [facility], w/ PMHx of Alzheimer's' dementia, hx of CVA, HTN, DM, MDD, and recurrent falls, was sent for being restless and combative, and hitting and kicking nursing home staff. Admitted for dementia and agitation though patient is currently calm and cooperative at [facility].    1/7/22-  used  Pt is aox3 (name, place, time), no signs of agitated behavior or aggression. Pt is behaving calm and taking all her medications. Pt is updated on current status of finding her rehab.      70 y/o female, ambulates independently, from [facility], w/ PMHx of Alzheimer's' dementia, hx of CVA, HTN, DM, MDD, and recurrent falls, was sent for being restless and combative, and hitting and kicking nursing home staff. Admitted for dementia and agitation though patient is currently calm and cooperative at [facility].    1/7/22-  used #[id number] Dorota  Pt is aox3 (name, place, time), no signs of agitated behavior or aggression. Pt is behaving calm and taking all her medications. Pt is updated on current status of finding her rehab.

## 2022-01-07 NOTE — PROGRESS NOTE ADULT - PROBLEM SELECTOR PLAN 7
- Home lantus 30U, admelog 10U tid.  - HbA1c 10.7 1/22  - continue Insulin ss (moderate)  - continue lantus to 40u HS and admelog 12U AC  - glucose well controlled  - FS achs

## 2022-01-07 NOTE — PROGRESS NOTE ADULT - PROBLEM SELECTOR PLAN 8
- Home meds Losartan, metoprolol  - c/w home meds  - continue amlodipine 5 mg  - start hydrochlorothiazide 12.5 mg  - moderately controlled  - continue dash diet  - monitor BP

## 2022-01-07 NOTE — PROGRESS NOTE ADULT - SUBJECTIVE AND OBJECTIVE BOX
Patient is a 71y old  Female who presents with a chief complaint of Agitation+Dementia (06 Jan 2022 11:44)    OVERNIGHT EVENTS: no acute changes    REVIEW OF SYSTEMS:  CONSTITUTIONAL: No fever, chills  ENMT:  No difficulty hearing  NECK: No pain or stiffness  RESPIRATORY: No cough, SOB  CARDIOVASCULAR: No chest pain, palpitations  GASTROINTESTINAL: No abdominal pain. No nausea, vomiting, or diarrhea  GENITOURINARY: No dysuria  NEUROLOGICAL: No HA  SKIN: No itching, burning, rashes, or lesions   MUSCULOSKELETAL: No joint pain or swelling; No muscle, back, or extremity pain    T(C): 36.3 (01-07-22 @ 05:12), Max: 36.7 (01-06-22 @ 20:19)  HR: 85 (01-07-22 @ 05:12) (60 - 85)  BP: 136/69 (01-07-22 @ 05:12) (136/69 - 149/62)  RR: 17 (01-07-22 @ 05:12) (17 - 18)  SpO2: 99% (01-07-22 @ 05:12) (98% - 100%)  Wt(kg): --Vital Signs Last 24 Hrs  T(C): 36.3 (07 Jan 2022 05:12), Max: 36.7 (06 Jan 2022 20:19)  T(F): 97.4 (07 Jan 2022 05:12), Max: 98.1 (06 Jan 2022 20:19)  HR: 85 (07 Jan 2022 05:12) (60 - 85)  BP: 136/69 (07 Jan 2022 05:12) (136/69 - 149/62)  BP(mean): 76 (06 Jan 2022 13:40) (76 - 76)  RR: 17 (07 Jan 2022 05:12) (17 - 18)  SpO2: 99% (07 Jan 2022 05:12) (98% - 100%)    MEDICATIONS  (STANDING):  amLODIPine   Tablet 5 milliGRAM(s) Oral daily  ascorbic acid 500 milliGRAM(s) Oral daily  aspirin  chewable 81 milliGRAM(s) Oral daily  dorzolamide 2%/timolol 0.5% Ophthalmic Solution 1 Drop(s) Both EYES two times a day  enoxaparin Injectable 40 milliGRAM(s) SubCutaneous daily  hydrochlorothiazide 12.5 milliGRAM(s) Oral daily  influenza  Vaccine (HIGH DOSE) 0.7 milliLiter(s) IntraMuscular once  insulin glargine Injectable (LANTUS) 40 Unit(s) SubCutaneous at bedtime  insulin lispro (ADMELOG) corrective regimen sliding scale   SubCutaneous three times a day before meals  insulin lispro Injectable (ADMELOG) 7 Unit(s) SubCutaneous three times a day before meals  losartan 100 milliGRAM(s) Oral daily  memantine 5 milliGRAM(s) Oral daily  metoprolol succinate  milliGRAM(s) Oral daily  prednisoLONE acetate 1% Suspension 1 Drop(s) Both EYES two times a day  traZODone 50 milliGRAM(s) Oral at bedtime    MEDICATIONS  (PRN):  melatonin 3 milliGRAM(s) Oral at bedtime PRN Sleep  metoprolol tartrate Injectable 5 milliGRAM(s) IV Push every 6 hours PRN SBP >166    PHYSICAL EXAM:  GENERAL: NAD  EYES: clear conjunctiva  ENMT: Moist mucous membranes  NECK: Supple, No JVD  CHEST/LUNG: Clear to auscultation bilaterally; No rales, rhonchi, wheezing, or rubs  HEART: S1, S2, Regular rate and rhythm  ABDOMEN: Soft, Nontender, Nondistended; Bowel sounds present  NEURO: Alert & Oriented X3 (name, place, time)  EXTREMITIES: No LE edema, no calf tenderness  SKIN: No rashes or lesions    Consultant(s) Notes Reviewed:  [x ] YES  [ ] NO  Care Discussed with Consultants/Other Providers [ x] YES  [ ] NO    CAPILLARY BLOOD GLUCOSE  POCT Blood Glucose.: 82 mg/dL (07 Jan 2022 07:48)  POCT Blood Glucose.: 234 mg/dL (06 Jan 2022 21:18)  POCT Blood Glucose.: 218 mg/dL (06 Jan 2022 16:22)  POCT Blood Glucose.: 103 mg/dL (06 Jan 2022 11:18)       Patient is a 71y old  Female who presents with a chief complaint of Agitation+Dementia (06 Jan 2022 11:44)    OVERNIGHT EVENTS: no acute changes    REVIEW OF SYSTEMS: [place]  #  CONSTITUTIONAL: No fever, chills  ENMT:  No difficulty hearing  NECK: No pain or stiffness  RESPIRATORY: No cough, SOB  CARDIOVASCULAR: No chest pain, palpitations  GASTROINTESTINAL: No abdominal pain. No nausea, vomiting, or diarrhea  GENITOURINARY: No dysuria  NEUROLOGICAL: No HA  SKIN: No itching, burning, rashes, or lesions   MUSCULOSKELETAL: No joint pain or swelling; No muscle, back, or extremity pain    T(C): 36.3 (01-07-22 @ 05:12), Max: 36.7 (01-06-22 @ 20:19)  HR: 85 (01-07-22 @ 05:12) (60 - 85)  BP: 136/69 (01-07-22 @ 05:12) (136/69 - 149/62)  RR: 17 (01-07-22 @ 05:12) (17 - 18)  SpO2: 99% (01-07-22 @ 05:12) (98% - 100%)  Wt(kg): --Vital Signs Last 24 Hrs  T(C): 36.3 (07 Jan 2022 05:12), Max: 36.7 (06 Jan 2022 20:19)  T(F): 97.4 (07 Jan 2022 05:12), Max: 98.1 (06 Jan 2022 20:19)  HR: 85 (07 Jan 2022 05:12) (60 - 85)  BP: 136/69 (07 Jan 2022 05:12) (136/69 - 149/62)  BP(mean): 76 (06 Jan 2022 13:40) (76 - 76)  RR: 17 (07 Jan 2022 05:12) (17 - 18)  SpO2: 99% (07 Jan 2022 05:12) (98% - 100%)    MEDICATIONS  (STANDING):  amLODIPine   Tablet 5 milliGRAM(s) Oral daily  ascorbic acid 500 milliGRAM(s) Oral daily  aspirin  chewable 81 milliGRAM(s) Oral daily  dorzolamide 2%/timolol 0.5% Ophthalmic Solution 1 Drop(s) Both EYES two times a day  enoxaparin Injectable 40 milliGRAM(s) SubCutaneous daily  hydrochlorothiazide 12.5 milliGRAM(s) Oral daily  influenza  Vaccine (HIGH DOSE) 0.7 milliLiter(s) IntraMuscular once  insulin glargine Injectable (LANTUS) 40 Unit(s) SubCutaneous at bedtime  insulin lispro (ADMELOG) corrective regimen sliding scale   SubCutaneous three times a day before meals  insulin lispro Injectable (ADMELOG) 7 Unit(s) SubCutaneous three times a day before meals  losartan 100 milliGRAM(s) Oral daily  memantine 5 milliGRAM(s) Oral daily  metoprolol succinate  milliGRAM(s) Oral daily  prednisoLONE acetate 1% Suspension 1 Drop(s) Both EYES two times a day  traZODone 50 milliGRAM(s) Oral at bedtime    MEDICATIONS  (PRN):  melatonin 3 milliGRAM(s) Oral at bedtime PRN Sleep  metoprolol tartrate Injectable 5 milliGRAM(s) IV Push every 6 hours PRN SBP >166    PHYSICAL EXAM:  GENERAL: NAD  EYES: clear conjunctiva  ENMT: Moist mucous membranes  NECK: Supple, No JVD  CHEST/LUNG: Clear to auscultation bilaterally; No rales, rhonchi, wheezing, or rubs  HEART: S1, S2, Regular rate and rhythm  ABDOMEN: Soft, Nontender, Nondistended; Bowel sounds present  NEURO: Alert & Oriented X3 (name, place, time)  EXTREMITIES: No LE edema, no calf tenderness  SKIN: No rashes or lesions    Consultant(s) Notes Reviewed:  [x ] YES  [ ] NO  Care Discussed with Consultants/Other Providers [ x] YES  [ ] NO    CAPILLARY BLOOD GLUCOSE  POCT Blood Glucose.: 82 mg/dL (07 Jan 2022 07:48)  POCT Blood Glucose.: 234 mg/dL (06 Jan 2022 21:18)  POCT Blood Glucose.: 218 mg/dL (06 Jan 2022 16:22)  POCT Blood Glucose.: 103 mg/dL (06 Jan 2022 11:18)       Patient is a 71y old  Female who presents with a chief complaint of Agitation+Dementia (06 Jan 2022 11:44)    OVERNIGHT EVENTS: no acute changes    REVIEW OF SYSTEMS: [place]  #[id number] Dorota  CONSTITUTIONAL: No fever, chills  ENMT:  No difficulty hearing  NECK: No pain or stiffness  RESPIRATORY: No cough, SOB  CARDIOVASCULAR: No chest pain, palpitations  GASTROINTESTINAL: No abdominal pain. No nausea, vomiting, or diarrhea  GENITOURINARY: No dysuria  NEUROLOGICAL: No HA  SKIN: No itching, burning, rashes, or lesions   MUSCULOSKELETAL: No joint pain or swelling; No muscle, back, or extremity pain    T(C): 36.3 (01-07-22 @ 05:12), Max: 36.7 (01-06-22 @ 20:19)  HR: 85 (01-07-22 @ 05:12) (60 - 85)  BP: 136/69 (01-07-22 @ 05:12) (136/69 - 149/62)  RR: 17 (01-07-22 @ 05:12) (17 - 18)  SpO2: 99% (01-07-22 @ 05:12) (98% - 100%)  Wt(kg): --Vital Signs Last 24 Hrs  T(C): 36.3 (07 Jan 2022 05:12), Max: 36.7 (06 Jan 2022 20:19)  T(F): 97.4 (07 Jan 2022 05:12), Max: 98.1 (06 Jan 2022 20:19)  HR: 85 (07 Jan 2022 05:12) (60 - 85)  BP: 136/69 (07 Jan 2022 05:12) (136/69 - 149/62)  BP(mean): 76 (06 Jan 2022 13:40) (76 - 76)  RR: 17 (07 Jan 2022 05:12) (17 - 18)  SpO2: 99% (07 Jan 2022 05:12) (98% - 100%)    MEDICATIONS  (STANDING):  amLODIPine   Tablet 5 milliGRAM(s) Oral daily  ascorbic acid 500 milliGRAM(s) Oral daily  aspirin  chewable 81 milliGRAM(s) Oral daily  dorzolamide 2%/timolol 0.5% Ophthalmic Solution 1 Drop(s) Both EYES two times a day  enoxaparin Injectable 40 milliGRAM(s) SubCutaneous daily  hydrochlorothiazide 12.5 milliGRAM(s) Oral daily  influenza  Vaccine (HIGH DOSE) 0.7 milliLiter(s) IntraMuscular once  insulin glargine Injectable (LANTUS) 40 Unit(s) SubCutaneous at bedtime  insulin lispro (ADMELOG) corrective regimen sliding scale   SubCutaneous three times a day before meals  insulin lispro Injectable (ADMELOG) 7 Unit(s) SubCutaneous three times a day before meals  losartan 100 milliGRAM(s) Oral daily  memantine 5 milliGRAM(s) Oral daily  metoprolol succinate  milliGRAM(s) Oral daily  prednisoLONE acetate 1% Suspension 1 Drop(s) Both EYES two times a day  traZODone 50 milliGRAM(s) Oral at bedtime    MEDICATIONS  (PRN):  melatonin 3 milliGRAM(s) Oral at bedtime PRN Sleep  metoprolol tartrate Injectable 5 milliGRAM(s) IV Push every 6 hours PRN SBP >166    PHYSICAL EXAM:  GENERAL: NAD  EYES: clear conjunctiva  ENMT: Moist mucous membranes  NECK: Supple, No JVD  CHEST/LUNG: Clear to auscultation bilaterally; No rales, rhonchi, wheezing, or rubs  HEART: S1, S2, Regular rate and rhythm  ABDOMEN: Soft, Nontender, Nondistended; Bowel sounds present  NEURO: Alert & Oriented X3 (name, place, time)  EXTREMITIES: No LE edema, no calf tenderness  SKIN: No rashes or lesions    Consultant(s) Notes Reviewed:  [x ] YES  [ ] NO  Care Discussed with Consultants/Other Providers [ x] YES  [ ] NO    CAPILLARY BLOOD GLUCOSE  POCT Blood Glucose.: 82 mg/dL (07 Jan 2022 07:48)  POCT Blood Glucose.: 234 mg/dL (06 Jan 2022 21:18)  POCT Blood Glucose.: 218 mg/dL (06 Jan 2022 16:22)  POCT Blood Glucose.: 103 mg/dL (06 Jan 2022 11:18)

## 2022-01-08 LAB
GLUCOSE BLDC GLUCOMTR-MCNC: 117 MG/DL — HIGH (ref 70–99)
GLUCOSE BLDC GLUCOMTR-MCNC: 141 MG/DL — HIGH (ref 70–99)
GLUCOSE BLDC GLUCOMTR-MCNC: 149 MG/DL — HIGH (ref 70–99)
GLUCOSE BLDC GLUCOMTR-MCNC: 163 MG/DL — HIGH (ref 70–99)
GLUCOSE BLDC GLUCOMTR-MCNC: 89 MG/DL — SIGNIFICANT CHANGE UP (ref 70–99)

## 2022-01-08 PROCEDURE — 99232 SBSQ HOSP IP/OBS MODERATE 35: CPT

## 2022-01-08 RX ADMIN — Medication 100 MILLIGRAM(S): at 05:48

## 2022-01-08 RX ADMIN — DORZOLAMIDE HYDROCHLORIDE TIMOLOL MALEATE 1 DROP(S): 20; 5 SOLUTION/ DROPS OPHTHALMIC at 17:12

## 2022-01-08 RX ADMIN — ENOXAPARIN SODIUM 40 MILLIGRAM(S): 100 INJECTION SUBCUTANEOUS at 11:48

## 2022-01-08 RX ADMIN — Medication 1: at 16:35

## 2022-01-08 RX ADMIN — Medication 1 DROP(S): at 05:48

## 2022-01-08 RX ADMIN — Medication 1 DROP(S): at 17:11

## 2022-01-08 RX ADMIN — MEMANTINE HYDROCHLORIDE 5 MILLIGRAM(S): 10 TABLET ORAL at 14:35

## 2022-01-08 RX ADMIN — DORZOLAMIDE HYDROCHLORIDE TIMOLOL MALEATE 1 DROP(S): 20; 5 SOLUTION/ DROPS OPHTHALMIC at 05:48

## 2022-01-08 RX ADMIN — INSULIN GLARGINE 40 UNIT(S): 100 INJECTION, SOLUTION SUBCUTANEOUS at 22:30

## 2022-01-08 RX ADMIN — Medication 81 MILLIGRAM(S): at 13:41

## 2022-01-08 RX ADMIN — Medication 50 MILLIGRAM(S): at 22:30

## 2022-01-08 RX ADMIN — AMLODIPINE BESYLATE 5 MILLIGRAM(S): 2.5 TABLET ORAL at 05:48

## 2022-01-08 RX ADMIN — LOSARTAN POTASSIUM 100 MILLIGRAM(S): 100 TABLET, FILM COATED ORAL at 05:48

## 2022-01-08 RX ADMIN — Medication 500 MILLIGRAM(S): at 16:34

## 2022-01-08 RX ADMIN — Medication 7 UNIT(S): at 16:36

## 2022-01-08 NOTE — PROGRESS NOTE ADULT - SUBJECTIVE AND OBJECTIVE BOX
Patient is a 71y old  Female who presents with a chief complaint of Agitation+Dementia (07 Jan 2022 10:53)      SUBJECTIVE / OVERNIGHT EVENTS: GORDO overnight, no new complaints. Patient awaiting acceptance to [facility] and [facility]   ADDITIONAL REVIEW OF SYSTEMS: negative as per above    MEDICATIONS  (STANDING):  amLODIPine   Tablet 5 milliGRAM(s) Oral daily  ascorbic acid 500 milliGRAM(s) Oral daily  aspirin  chewable 81 milliGRAM(s) Oral daily  dorzolamide 2%/timolol 0.5% Ophthalmic Solution 1 Drop(s) Both EYES two times a day  enoxaparin Injectable 40 milliGRAM(s) SubCutaneous daily  hydrochlorothiazide 12.5 milliGRAM(s) Oral daily  influenza  Vaccine (HIGH DOSE) 0.7 milliLiter(s) IntraMuscular once  insulin glargine Injectable (LANTUS) 40 Unit(s) SubCutaneous at bedtime  insulin lispro (ADMELOG) corrective regimen sliding scale   SubCutaneous three times a day before meals  insulin lispro Injectable (ADMELOG) 7 Unit(s) SubCutaneous three times a day before meals  losartan 100 milliGRAM(s) Oral daily  memantine 5 milliGRAM(s) Oral daily  metoprolol succinate  milliGRAM(s) Oral daily  prednisoLONE acetate 1% Suspension 1 Drop(s) Both EYES two times a day  traZODone 50 milliGRAM(s) Oral at bedtime    MEDICATIONS  (PRN):  melatonin 3 milliGRAM(s) Oral at bedtime PRN Sleep  metoprolol tartrate Injectable 5 milliGRAM(s) IV Push every 6 hours PRN SBP >166      CAPILLARY BLOOD GLUCOSE      POCT Blood Glucose.: 89 mg/dL (08 Jan 2022 08:10)  POCT Blood Glucose.: 192 mg/dL (07 Jan 2022 21:15)  POCT Blood Glucose.: 147 mg/dL (07 Jan 2022 16:42)  POCT Blood Glucose.: 165 mg/dL (07 Jan 2022 11:20)    I&O's Summary      PHYSICAL EXAM:  Vital Signs Last 24 Hrs  T(C): 36.4 (08 Jan 2022 05:05), Max: 36.7 (07 Jan 2022 13:25)  T(F): 97.6 (08 Jan 2022 05:05), Max: 98 (07 Jan 2022 13:25)  HR: 91 (08 Jan 2022 05:05) (78 - 91)  BP: 147/75 (08 Jan 2022 05:05) (141/68 - 152/74)  BP(mean): 92 (07 Jan 2022 13:25) (92 - 92)  RR: 18 (08 Jan 2022 05:05) (17 - 18)  SpO2: 99% (08 Jan 2022 05:05) (98% - 100%)    GENERAL: NAD   HEAD:  Atraumatic, Normocephalic  CHEST/LUNG: Clear to auscultation bilaterally; No wheeze  HEART: Regular rate and rhythm; No murmurs, rubs, or gallops  ABDOMEN: Soft, Nontender, Nondistended; Bowel sounds present  EXTREMITIES:  2+ Peripheral Pulses, No clubbing, cyanosis, or edema  PSYCH: AAOx2  NEUROLOGY: non-focal       LABS:                    Trend Procalcitonin  12-28-21 @ 19:50   -  0.06          D-Dimer:      RADIOLOGY & ADDITIONAL TESTS:  Results Reviewed:   Imaging Personally Reviewed:  Electrocardiogram Personally Reviewed:    COORDINATION OF CARE:  Care Discussed with Consultants/Other Providers [Y/N]:  Prior or Outpatient Records Reviewed [Y/N]:   Patient is a 71y old  Female who presents with a chief complaint of Agitation+Dementia (07 Jan 2022 10:53)      SUBJECTIVE / OVERNIGHT EVENTS: GORDO overnight. Patient awaiting acceptance to [facility] and [facility]. Used Scottish interpretor to speak to patient, patient denies any issues or complaints today.   ADDITIONAL REVIEW OF SYSTEMS: negative as per above    MEDICATIONS  (STANDING):  amLODIPine   Tablet 5 milliGRAM(s) Oral daily  ascorbic acid 500 milliGRAM(s) Oral daily  aspirin  chewable 81 milliGRAM(s) Oral daily  dorzolamide 2%/timolol 0.5% Ophthalmic Solution 1 Drop(s) Both EYES two times a day  enoxaparin Injectable 40 milliGRAM(s) SubCutaneous daily  hydrochlorothiazide 12.5 milliGRAM(s) Oral daily  influenza  Vaccine (HIGH DOSE) 0.7 milliLiter(s) IntraMuscular once  insulin glargine Injectable (LANTUS) 40 Unit(s) SubCutaneous at bedtime  insulin lispro (ADMELOG) corrective regimen sliding scale   SubCutaneous three times a day before meals  insulin lispro Injectable (ADMELOG) 7 Unit(s) SubCutaneous three times a day before meals  losartan 100 milliGRAM(s) Oral daily  memantine 5 milliGRAM(s) Oral daily  metoprolol succinate  milliGRAM(s) Oral daily  prednisoLONE acetate 1% Suspension 1 Drop(s) Both EYES two times a day  traZODone 50 milliGRAM(s) Oral at bedtime    MEDICATIONS  (PRN):  melatonin 3 milliGRAM(s) Oral at bedtime PRN Sleep  metoprolol tartrate Injectable 5 milliGRAM(s) IV Push every 6 hours PRN SBP >166      CAPILLARY BLOOD GLUCOSE      POCT Blood Glucose.: 89 mg/dL (08 Jan 2022 08:10)  POCT Blood Glucose.: 192 mg/dL (07 Jan 2022 21:15)  POCT Blood Glucose.: 147 mg/dL (07 Jan 2022 16:42)  POCT Blood Glucose.: 165 mg/dL (07 Jan 2022 11:20)    I&O's Summary      PHYSICAL EXAM:  Vital Signs Last 24 Hrs  T(C): 36.4 (08 Jan 2022 05:05), Max: 36.7 (07 Jan 2022 13:25)  T(F): 97.6 (08 Jan 2022 05:05), Max: 98 (07 Jan 2022 13:25)  HR: 91 (08 Jan 2022 05:05) (78 - 91)  BP: 147/75 (08 Jan 2022 05:05) (141/68 - 152/74)  BP(mean): 92 (07 Jan 2022 13:25) (92 - 92)  RR: 18 (08 Jan 2022 05:05) (17 - 18)  SpO2: 99% (08 Jan 2022 05:05) (98% - 100%)    GENERAL: NAD   HEAD:  Atraumatic, Normocephalic  CHEST/LUNG: Clear to auscultation bilaterally; No wheeze  HEART: Regular rate and rhythm; No murmurs, rubs, or gallops  ABDOMEN: Soft, Nontender, Nondistended; Bowel sounds present  EXTREMITIES:  2+ Peripheral Pulses, No clubbing, cyanosis, or edema  PSYCH: AAOx2  NEUROLOGY: non-focal       LABS:                    Trend Procalcitonin  12-28-21 @ 19:50   -  0.06          D-Dimer:      RADIOLOGY & ADDITIONAL TESTS:  Results Reviewed:   Imaging Personally Reviewed:  Electrocardiogram Personally Reviewed:    COORDINATION OF CARE:  Care Discussed with Consultants/Other Providers [Y/N]:  Prior or Outpatient Records Reviewed [Y/N]:

## 2022-01-08 NOTE — PROGRESS NOTE ADULT - ASSESSMENT
72 y/o female, ambulates independently, from [facility], w/ PMHx of Alzheimer's' dementia, hx of CVA, HTN, DM, MDD, and recurrent falls, was sent for being restless and combative, and hitting and kicking nursing home staff. Admitted for dementia and agitation though patient is currently calm and cooperative at [facility].    1/7/22-  used #[id number] Dorota  Pt is aox3 (name, place, time), no signs of agitated behavior or aggression. Pt is behaving calm and taking all her medications. Pt is updated on current status of finding her rehab.

## 2022-01-08 NOTE — CHART NOTE - NSCHARTNOTEFT_GEN_A_CORE
Blood sugar is at 117. She is due to admelog 7u this lunch time. There is a concern for hypoglycemia if this is given. Asked the nurse to hold lunch dose. Recheck sugar as routine.

## 2022-01-09 LAB
GLUCOSE BLDC GLUCOMTR-MCNC: 120 MG/DL — HIGH (ref 70–99)
GLUCOSE BLDC GLUCOMTR-MCNC: 145 MG/DL — HIGH (ref 70–99)
GLUCOSE BLDC GLUCOMTR-MCNC: 273 MG/DL — HIGH (ref 70–99)
GLUCOSE BLDC GLUCOMTR-MCNC: 337 MG/DL — HIGH (ref 70–99)
GLUCOSE BLDC GLUCOMTR-MCNC: 379 MG/DL — HIGH (ref 70–99)
GLUCOSE BLDC GLUCOMTR-MCNC: 63 MG/DL — LOW (ref 70–99)

## 2022-01-09 PROCEDURE — 99232 SBSQ HOSP IP/OBS MODERATE 35: CPT

## 2022-01-09 RX ADMIN — Medication 7 UNIT(S): at 16:44

## 2022-01-09 RX ADMIN — Medication 7 UNIT(S): at 08:22

## 2022-01-09 RX ADMIN — DORZOLAMIDE HYDROCHLORIDE TIMOLOL MALEATE 1 DROP(S): 20; 5 SOLUTION/ DROPS OPHTHALMIC at 17:02

## 2022-01-09 RX ADMIN — Medication 1 DROP(S): at 06:00

## 2022-01-09 RX ADMIN — Medication 50 MILLIGRAM(S): at 21:27

## 2022-01-09 RX ADMIN — AMLODIPINE BESYLATE 5 MILLIGRAM(S): 2.5 TABLET ORAL at 05:56

## 2022-01-09 RX ADMIN — DORZOLAMIDE HYDROCHLORIDE TIMOLOL MALEATE 1 DROP(S): 20; 5 SOLUTION/ DROPS OPHTHALMIC at 05:56

## 2022-01-09 RX ADMIN — Medication 1 DROP(S): at 17:02

## 2022-01-09 RX ADMIN — MEMANTINE HYDROCHLORIDE 5 MILLIGRAM(S): 10 TABLET ORAL at 12:20

## 2022-01-09 RX ADMIN — ENOXAPARIN SODIUM 40 MILLIGRAM(S): 100 INJECTION SUBCUTANEOUS at 12:20

## 2022-01-09 RX ADMIN — LOSARTAN POTASSIUM 100 MILLIGRAM(S): 100 TABLET, FILM COATED ORAL at 05:55

## 2022-01-09 RX ADMIN — Medication 81 MILLIGRAM(S): at 12:20

## 2022-01-09 RX ADMIN — Medication 100 MILLIGRAM(S): at 05:55

## 2022-01-09 RX ADMIN — INSULIN GLARGINE 40 UNIT(S): 100 INJECTION, SOLUTION SUBCUTANEOUS at 21:27

## 2022-01-09 RX ADMIN — Medication 3: at 16:43

## 2022-01-09 RX ADMIN — Medication 500 MILLIGRAM(S): at 12:20

## 2022-01-09 NOTE — PROGRESS NOTE ADULT - ASSESSMENT
70 y/o female, ambulates independently, from [facility], w/ PMHx of Alzheimer's' dementia, hx of CVA, HTN, DM, MDD, and recurrent falls, was sent for being restless and combative, and hitting and kicking nursing home staff. Admitted for dementia and agitation though patient is currently calm and cooperative at [facility].    1/7/22-  used #[id number] Dorota  Pt is aox3 (name, place, time), no signs of agitated behavior or aggression. Pt is behaving calm and taking all her medications. Pt is updated on current status of finding her rehab.

## 2022-01-09 NOTE — PROGRESS NOTE ADULT - PROBLEM SELECTOR PROBLEM 9
Recurrent falls

## 2022-01-09 NOTE — PROGRESS NOTE ADULT - SUBJECTIVE AND OBJECTIVE BOX
Patient is a 71y old  Female who presents with a chief complaint of Agitation+Dementia (08 Jan 2022 09:09)      SUBJECTIVE / OVERNIGHT EVENTS: GORDO overnight, no new complaints. Patient tolerating diet well   ADDITIONAL REVIEW OF SYSTEMS: negative as per above    MEDICATIONS  (STANDING):  amLODIPine   Tablet 5 milliGRAM(s) Oral daily  ascorbic acid 500 milliGRAM(s) Oral daily  aspirin  chewable 81 milliGRAM(s) Oral daily  dorzolamide 2%/timolol 0.5% Ophthalmic Solution 1 Drop(s) Both EYES two times a day  enoxaparin Injectable 40 milliGRAM(s) SubCutaneous daily  hydrochlorothiazide 12.5 milliGRAM(s) Oral daily  influenza  Vaccine (HIGH DOSE) 0.7 milliLiter(s) IntraMuscular once  insulin glargine Injectable (LANTUS) 40 Unit(s) SubCutaneous at bedtime  insulin lispro (ADMELOG) corrective regimen sliding scale   SubCutaneous three times a day before meals  insulin lispro Injectable (ADMELOG) 7 Unit(s) SubCutaneous three times a day before meals  losartan 100 milliGRAM(s) Oral daily  memantine 5 milliGRAM(s) Oral daily  metoprolol succinate  milliGRAM(s) Oral daily  prednisoLONE acetate 1% Suspension 1 Drop(s) Both EYES two times a day  traZODone 50 milliGRAM(s) Oral at bedtime    MEDICATIONS  (PRN):  melatonin 3 milliGRAM(s) Oral at bedtime PRN Sleep  metoprolol tartrate Injectable 5 milliGRAM(s) IV Push every 6 hours PRN SBP >166      CAPILLARY BLOOD GLUCOSE      POCT Blood Glucose.: 120 mg/dL (09 Jan 2022 08:01)  POCT Blood Glucose.: 141 mg/dL (08 Jan 2022 22:28)  POCT Blood Glucose.: 149 mg/dL (08 Jan 2022 21:20)  POCT Blood Glucose.: 163 mg/dL (08 Jan 2022 16:13)  POCT Blood Glucose.: 117 mg/dL (08 Jan 2022 11:08)    I&O's Summary      PHYSICAL EXAM:  Vital Signs Last 24 Hrs  T(C): 36.8 (09 Jan 2022 05:12), Max: 37.1 (08 Jan 2022 13:35)  T(F): 98.2 (09 Jan 2022 05:12), Max: 98.8 (08 Jan 2022 13:35)  HR: 86 (09 Jan 2022 05:12) (79 - 86)  BP: 150/79 (09 Jan 2022 05:12) (139/56 - 150/79)  BP(mean): --  RR: 16 (09 Jan 2022 05:12) (16 - 20)  SpO2: 98% (09 Jan 2022 05:12) (96% - 100%)    GENERAL: NAD,    HEAD:  Atraumatic  CHEST/LUNG: Clear to auscultation bilaterally   HEART: RRR no m/g/r  ABDOMEN: Soft, Nontender,   EXTREMITIES:  2+ Peripheral Pulses, no c/c/e  PSYCH: AAOx2  NEUROLOGY: non-focal     LABS:                    Trend Procalcitonin  12-28-21 @ 19:50   -  0.06          D-Dimer:      RADIOLOGY & ADDITIONAL TESTS:  Results Reviewed:   Imaging Personally Reviewed:  Electrocardiogram Personally Reviewed:    COORDINATION OF CARE:  Care Discussed with Consultants/Other Providers [Y/N]:  Prior or Outpatient Records Reviewed [Y/N]:

## 2022-01-10 ENCOUNTER — TRANSCRIPTION ENCOUNTER (OUTPATIENT)
Age: 72
End: 2022-01-10

## 2022-01-10 VITALS
DIASTOLIC BLOOD PRESSURE: 68 MMHG | TEMPERATURE: 98 F | SYSTOLIC BLOOD PRESSURE: 147 MMHG | HEART RATE: 77 BPM | RESPIRATION RATE: 17 BRPM | OXYGEN SATURATION: 99 %

## 2022-01-10 LAB
GLUCOSE BLDC GLUCOMTR-MCNC: 185 MG/DL — HIGH (ref 70–99)
GLUCOSE BLDC GLUCOMTR-MCNC: 94 MG/DL — SIGNIFICANT CHANGE UP (ref 70–99)
GLUCOSE BLDC GLUCOMTR-MCNC: 99 MG/DL — SIGNIFICANT CHANGE UP (ref 70–99)
SARS-COV-2 RNA SPEC QL NAA+PROBE: SIGNIFICANT CHANGE UP

## 2022-01-10 PROCEDURE — 85025 COMPLETE CBC W/AUTO DIFF WBC: CPT

## 2022-01-10 PROCEDURE — 81001 URINALYSIS AUTO W/SCOPE: CPT

## 2022-01-10 PROCEDURE — 80053 COMPREHEN METABOLIC PANEL: CPT

## 2022-01-10 PROCEDURE — 82962 GLUCOSE BLOOD TEST: CPT

## 2022-01-10 PROCEDURE — 87635 SARS-COV-2 COVID-19 AMP PRB: CPT

## 2022-01-10 PROCEDURE — 80048 BASIC METABOLIC PNL TOTAL CA: CPT

## 2022-01-10 PROCEDURE — 83735 ASSAY OF MAGNESIUM: CPT

## 2022-01-10 PROCEDURE — 71045 X-RAY EXAM CHEST 1 VIEW: CPT

## 2022-01-10 PROCEDURE — 93005 ELECTROCARDIOGRAM TRACING: CPT

## 2022-01-10 PROCEDURE — 84100 ASSAY OF PHOSPHORUS: CPT

## 2022-01-10 PROCEDURE — 36415 COLL VENOUS BLD VENIPUNCTURE: CPT

## 2022-01-10 PROCEDURE — 84145 PROCALCITONIN (PCT): CPT

## 2022-01-10 PROCEDURE — 86803 HEPATITIS C AB TEST: CPT

## 2022-01-10 PROCEDURE — 99232 SBSQ HOSP IP/OBS MODERATE 35: CPT

## 2022-01-10 PROCEDURE — 83036 HEMOGLOBIN GLYCOSYLATED A1C: CPT

## 2022-01-10 PROCEDURE — 99285 EMERGENCY DEPT VISIT HI MDM: CPT

## 2022-01-10 PROCEDURE — 84484 ASSAY OF TROPONIN QUANT: CPT

## 2022-01-10 PROCEDURE — 85027 COMPLETE CBC AUTOMATED: CPT

## 2022-01-10 PROCEDURE — 83605 ASSAY OF LACTIC ACID: CPT

## 2022-01-10 PROCEDURE — 87040 BLOOD CULTURE FOR BACTERIA: CPT

## 2022-01-10 PROCEDURE — 87086 URINE CULTURE/COLONY COUNT: CPT

## 2022-01-10 RX ORDER — HYDROCHLOROTHIAZIDE 25 MG
1 TABLET ORAL
Qty: 30 | Refills: 0
Start: 2022-01-10 | End: 2022-02-08

## 2022-01-10 RX ADMIN — Medication 500 MILLIGRAM(S): at 12:00

## 2022-01-10 RX ADMIN — MEMANTINE HYDROCHLORIDE 5 MILLIGRAM(S): 10 TABLET ORAL at 12:01

## 2022-01-10 RX ADMIN — LOSARTAN POTASSIUM 100 MILLIGRAM(S): 100 TABLET, FILM COATED ORAL at 06:02

## 2022-01-10 RX ADMIN — Medication 7 UNIT(S): at 08:07

## 2022-01-10 RX ADMIN — Medication 1 DROP(S): at 06:02

## 2022-01-10 RX ADMIN — Medication 1 DROP(S): at 17:17

## 2022-01-10 RX ADMIN — Medication 1: at 08:07

## 2022-01-10 RX ADMIN — DORZOLAMIDE HYDROCHLORIDE TIMOLOL MALEATE 1 DROP(S): 20; 5 SOLUTION/ DROPS OPHTHALMIC at 17:17

## 2022-01-10 RX ADMIN — Medication 100 MILLIGRAM(S): at 06:01

## 2022-01-10 RX ADMIN — AMLODIPINE BESYLATE 5 MILLIGRAM(S): 2.5 TABLET ORAL at 06:01

## 2022-01-10 RX ADMIN — Medication 7 UNIT(S): at 17:15

## 2022-01-10 RX ADMIN — DORZOLAMIDE HYDROCHLORIDE TIMOLOL MALEATE 1 DROP(S): 20; 5 SOLUTION/ DROPS OPHTHALMIC at 06:02

## 2022-01-10 RX ADMIN — Medication 7 UNIT(S): at 12:00

## 2022-01-10 RX ADMIN — ENOXAPARIN SODIUM 40 MILLIGRAM(S): 100 INJECTION SUBCUTANEOUS at 12:01

## 2022-01-10 RX ADMIN — Medication 81 MILLIGRAM(S): at 12:01

## 2022-01-10 NOTE — PROGRESS NOTE ADULT - PROBLEM SELECTOR PLAN 6
- Home med Trazadone  - C/w home med
- Fall precautions  - PT eval - [facility]
- Home med Trazadone  - C/w home med

## 2022-01-10 NOTE — DISCHARGE NOTE NURSING/CASE MANAGEMENT/SOCIAL WORK - NSDCPEFALRISK_GEN_ALL_CORE
For information on Fall & Injury Prevention, visit: [email or web address] OR  [email or web address] OR  https://www.cdc.gov/steadi/patient.html

## 2022-01-10 NOTE — PROGRESS NOTE ADULT - PROBLEM SELECTOR PROBLEM 7
DM (diabetes mellitus)
Prophylactic measure
DM (diabetes mellitus)

## 2022-01-10 NOTE — PROGRESS NOTE ADULT - ATTENDING COMMENTS
70 yo F with dementia, hx of CVA, HTN, DM2, MDD, and recurrent falls who presents after an episode of agitation in [facility].     Patient remains calm and cooperative at [facility]. Has not required any prn medications for agitation. Has had no periods of agitation during hospitalization and communicating with staff appropriately. Patient is tolerating diet well, has had no issues or complaints.     Currently pending acceptance to [facility], remains medically cleared for discharge.
Patient seen and examined. Case discussed with Dr. Edouard. In brief, this is a 72 yo F with dementia, hx of CVA, HTN, DM2, MDD, and recurrent falls who presents after an episode of agitation in [facility]. Patient has been calm and cooperative at [facility]. Communicated with patient via Dutch  #[id number]. Patient offers no complaints and says she is fine.    #DM2 with Hyperglycemia  -f.s. above goal, will increase Ademelog to 15/15/15 and continue Lantus 40U QHS  -Continue f.s qACHS    #HTN  -Continue losartan and metoprolol  -BP high yesterday but improved today    #Dementia  -Continue [employer] planning to [facility] pending acceptance
72 yo F with dementia, hx of CVA, HTN, DM2, MDD, and recurrent falls who presents after an episode of agitation in [facility].     Patient remains calm and cooperative at [facility]. Has not required any prn medications for agitation. Has has no periods of agitation during hospitalization and communicating with staff appropriately.     Currently pending acceptance to [facility], remains medically cleared for discharge
70 yo F with dementia, hx of CVA, HTN, DM2, MDD, and recurrent falls who presents after an episode of agitation in [facility].     Patient remains calm and cooperative at [facility]. Has not required any prn medications for agitation. Has had no periods of agitation during hospitalization and communicating with staff appropriately.     Currently pending acceptance to [facility], remains medically cleared for discharge
stable, awaiting placement, cw current mx
70 yo F with dementia, hx of CVA, HTN, DM2, MDD, and recurrent falls who presents after an episode of agitation in [facility].     Patient remains calm and cooperative at [facility]. Has not required any prn medications for agitation. Has had no periods of agitation during hospitalization and communicating with staff appropriately.     Currently pending acceptance to [facility] remains medically cleared for discharge .
Patient seen and examined on 1/3/22. Case discussed with Dr. Edouard. In brief, this is a 70 yo F with dementia, hx of CVA, HTN, DM2, MDD, and recurrent falls who presents after an episode of agitation in [facility]. Patient has been calm and cooperative at [facility]. Communicated with patient via [place]  #[id number], Thalia. Patient offers no complaints and says she is alright. Sugar of 58 this afternoon.    #DM2 with Hypoglycemia  Episode of hypoglycemia today to 58, improved with juice to 90  -Hold lunchtime Ademelog and decrease to 7U before meals. Continue Lantus 40U QHS for now but may need to decrease as well.  -Continue f.s qACHS    #HTN  -Continue losartan and metoprolol  -BP acceptable today    #Dementia  -Continue [employer] planning to [facility] pending acceptance.
patient at baseline. will need SNF placement  BP and Glucose controlled at current regimen- will monitor  SW for placement  patient sundowns- need re-direction
No s.s of infection, dc atbx  cw home medications  patient calm in hospital  dc planning

## 2022-01-10 NOTE — PROGRESS NOTE ADULT - PROBLEM SELECTOR PROBLEM 5
Discharge planning issues
HTN (hypertension)
Discharge planning issues

## 2022-01-10 NOTE — PROGRESS NOTE ADULT - PROBLEM SELECTOR PLAN 2
due to progressing dementia  -increased forgetfulness   -Infectious w/u negative  -Safety/aspiration precautions

## 2022-01-10 NOTE — PROGRESS NOTE ADULT - PROBLEM SELECTOR PROBLEM 2
Acute encephalopathy

## 2022-01-10 NOTE — PROGRESS NOTE ADULT - PROBLEM SELECTOR PROBLEM 3
Shortness of breath
Major depressive disorder
Shortness of breath

## 2022-01-10 NOTE — PROGRESS NOTE ADULT - REASON FOR ADMISSION
Agitation+Dementia
Agitation +Dementia

## 2022-01-10 NOTE — PROGRESS NOTE ADULT - PROBLEM SELECTOR PROBLEM 1
Alzheimer's dementia

## 2022-01-10 NOTE — PROGRESS NOTE ADULT - PROBLEM SELECTOR PROBLEM 4
Chest heaviness
DM (diabetes mellitus)
Chest heaviness

## 2022-01-10 NOTE — DISCHARGE NOTE NURSING/CASE MANAGEMENT/SOCIAL WORK - PATIENT PORTAL LINK FT
You can access the FollowMyHealth Patient Portal offered by [facility] by registering at the following website: [email or web address]. By joining [facility]’s FollowMyHealth portal, you will also be able to view your health information using other applications (apps) compatible with our system.

## 2022-01-10 NOTE — PROGRESS NOTE ADULT - PROBLEM SELECTOR PROBLEM 8
HTN (hypertension)
Discharge planning issues
HTN (hypertension)

## 2022-01-10 NOTE — PROGRESS NOTE ADULT - SUBJECTIVE AND OBJECTIVE BOX
NP Note discussed with  Primary Attending    Patient is a 71y old  Female who presents with a chief complaint of Agitation+Dementia (09 Jan 2022 10:20)      INTERVAL HPI/OVERNIGHT EVENTS: no new complaints    MEDICATIONS  (STANDING):  amLODIPine   Tablet 5 milliGRAM(s) Oral daily  ascorbic acid 500 milliGRAM(s) Oral daily  aspirin  chewable 81 milliGRAM(s) Oral daily  dorzolamide 2%/timolol 0.5% Ophthalmic Solution 1 Drop(s) Both EYES two times a day  enoxaparin Injectable 40 milliGRAM(s) SubCutaneous daily  hydrochlorothiazide 12.5 milliGRAM(s) Oral daily  influenza  Vaccine (HIGH DOSE) 0.7 milliLiter(s) IntraMuscular once  insulin glargine Injectable (LANTUS) 40 Unit(s) SubCutaneous at bedtime  insulin lispro (ADMELOG) corrective regimen sliding scale   SubCutaneous three times a day before meals  insulin lispro Injectable (ADMELOG) 7 Unit(s) SubCutaneous three times a day before meals  losartan 100 milliGRAM(s) Oral daily  memantine 5 milliGRAM(s) Oral daily  metoprolol succinate  milliGRAM(s) Oral daily  prednisoLONE acetate 1% Suspension 1 Drop(s) Both EYES two times a day  traZODone 50 milliGRAM(s) Oral at bedtime    MEDICATIONS  (PRN):  melatonin 3 milliGRAM(s) Oral at bedtime PRN Sleep  metoprolol tartrate Injectable 5 milliGRAM(s) IV Push every 6 hours PRN SBP >166      __________________________________________________  REVIEW OF SYSTEMS:  Unable to obtain due to mentation      Vital Signs Last 24 Hrs  T(C): 36.5 (10 Kyler 2022 05:09), Max: 36.9 (09 Jan 2022 20:28)  T(F): 97.7 (10 Kyler 2022 05:09), Max: 98.5 (09 Jan 2022 20:28)  HR: 90 (10 Kyler 2022 05:09) (87 - 91)  BP: 173/82 (10 Kyler 2022 05:09) (130/59 - 173/82)  BP(mean): --  RR: 16 (10 Kyler 2022 05:09) (16 - 17)  SpO2: 99% (10 Kyler 2022 05:09) (97% - 99%)    ________________________________________________  PHYSICAL EXAM:  Pleasantly confused  GENERAL: NAD  HEENT: Normocephalic;  conjunctivae and sclerae clear; moist mucous membranes;   NECK : supple  CHEST/LUNG: Clear to auscultation bilaterally with good air entry   HEART: S1 S2  regular; no murmurs, gallops or rubs  ABDOMEN: Soft, Nontender, Nondistended; Bowel sounds present  EXTREMITIES: no cyanosis; no edema; no calf tenderness  SKIN: warm and dry; no rash  NERVOUS SYSTEM:  Awake and alert; unable to obtain due to mentation    _________________________________________________  LABS:    CAPILLARY BLOOD GLUCOSE  < from: Xray Chest 1 View AP/PA (12.28.21 @ 12:46) >  ACC: [id number] EXAM:  XR CHEST AP OR PA 1V                          PROCEDURE DATE:  12/28/2021          INTERPRETATION:  Portable chest radiograph    CLINICAL INFORMATION: Agitation. Assess for infection.    TECHNIQUE:  Portable  AP view of the chest.    COMPARISON: No previous examinations are available for review.    FINDINGS:    The visualized lungs are clear of airspace consolidations or effusions.   No pneumothorax.    The heart and mediastinum size and configuration are within normal limits.    Visualized osseous structures are intact.    IMPRESSION:   No radiographic evidence of active chest disease.    < end of copied text >      POCT Blood Glucose.: 185 mg/dL (10 Kyler 2022 07:35)  POCT Blood Glucose.: 337 mg/dL (09 Jan 2022 21:17)  POCT Blood Glucose.: 273 mg/dL (09 Jan 2022 16:39)  POCT Blood Glucose.: 379 mg/dL (09 Jan 2022 16:38)  POCT Blood Glucose.: 145 mg/dL (09 Jan 2022 12:52)  POCT Blood Glucose.: 63 mg/dL (09 Jan 2022 11:35)        RADIOLOGY & ADDITIONAL TESTS:        Imaging Personally Reviewed:  YES/NO    Consultant(s) Notes Reviewed:   YES/ No    Care Discussed with Consultants :     Plan of care was discussed with patient and /or primary care giver; all questions and concerns were addressed and care was aligned with patient's wishes.

## 2022-01-10 NOTE — PROGRESS NOTE ADULT - PROBLEM SELECTOR PROBLEM 6
Major depressive disorder
Recurrent falls
Major depressive disorder

## 2022-01-10 NOTE — PROGRESS NOTE ADULT - PROVIDER SPECIALTY LIST ADULT
Internal Medicine
Hospitalist
Hospitalist
Internal Medicine

## 2022-01-10 NOTE — PROGRESS NOTE ADULT - ASSESSMENT
70 y/o female, ambulates independently, from [facility], w/ PMHx of Alzheimer's' dementia, hx of CVA, HTN, DM, MDD, and recurrent falls, was sent for being restless and combative, and hitting and kicking nursing home staff. Admitted for dementia and agitation though patient is currently calm and cooperative at [facility].    1/7/22-  used #[id number] Dorota  Pt is aox3 (name, place, time), no signs of agitated behavior or aggression. Pt is behaving calm and taking all her medications. Pt is updated on current status of finding her rehab.    1/10-Remains calm with no behavioral disturbances, medically stable for discharge, awaiting insurance/Auth clearance, CM following.

## 2022-02-11 ENCOUNTER — APPOINTMENT (OUTPATIENT)
Dept: OPHTHALMOLOGY | Facility: CLINIC | Age: 72
End: 2022-02-11
Payer: MEDICAID

## 2022-02-11 ENCOUNTER — NON-APPOINTMENT (OUTPATIENT)
Age: 72
End: 2022-02-11

## 2022-02-11 PROCEDURE — 92012 INTRM OPH EXAM EST PATIENT: CPT

## 2022-03-08 NOTE — PATIENT PROFILE ADULT - PRO INTERPRETER NEED 2
[place] Prednisone Counseling:  I discussed with the patient the risks of prolonged use of prednisone including but not limited to weight gain, insomnia, osteoporosis, mood changes, diabetes, susceptibility to infection, glaucoma and high blood pressure.  In cases where prednisone use is prolonged, patients should be monitored with blood pressure checks, serum glucose levels and an eye exam.  Additionally, the patient may need to be placed on GI prophylaxis, PCP prophylaxis, and calcium and vitamin D supplementation and/or a bisphosphonate.  The patient verbalized understanding of the proper use and the possible adverse effects of prednisone.  All of the patient's questions and concerns were addressed.

## 2022-03-11 ENCOUNTER — APPOINTMENT (OUTPATIENT)
Dept: OPHTHALMOLOGY | Facility: CLINIC | Age: 72
End: 2022-03-11
Payer: MEDICAID

## 2022-03-11 ENCOUNTER — NON-APPOINTMENT (OUTPATIENT)
Age: 72
End: 2022-03-11

## 2022-03-11 PROCEDURE — 92134 CPTRZ OPH DX IMG PST SGM RTA: CPT

## 2022-03-11 PROCEDURE — 92012 INTRM OPH EXAM EST PATIENT: CPT

## 2022-03-31 ENCOUNTER — APPOINTMENT (OUTPATIENT)
Dept: OPHTHALMOLOGY | Facility: CLINIC | Age: 72
End: 2022-03-31
Payer: MEDICAID

## 2022-03-31 ENCOUNTER — NON-APPOINTMENT (OUTPATIENT)
Age: 72
End: 2022-03-31

## 2022-03-31 PROCEDURE — 92012 INTRM OPH EXAM EST PATIENT: CPT

## 2022-03-31 PROCEDURE — 92133 CPTRZD OPH DX IMG PST SGM ON: CPT

## 2022-04-01 ENCOUNTER — APPOINTMENT (OUTPATIENT)
Dept: CT IMAGING | Facility: HOSPITAL | Age: 72
End: 2022-04-01

## 2022-04-08 ENCOUNTER — APPOINTMENT (OUTPATIENT)
Dept: OPHTHALMOLOGY | Facility: CLINIC | Age: 72
End: 2022-04-08
Payer: MEDICARE

## 2022-04-08 ENCOUNTER — NON-APPOINTMENT (OUTPATIENT)
Age: 72
End: 2022-04-08

## 2022-04-08 PROCEDURE — 92012 INTRM OPH EXAM EST PATIENT: CPT

## 2022-04-08 PROCEDURE — 92136 OPHTHALMIC BIOMETRY: CPT | Mod: 26

## 2022-04-19 PROBLEM — Z00.00 ENCOUNTER FOR PREVENTIVE HEALTH EXAMINATION: Status: ACTIVE | Noted: 2022-04-19

## 2022-04-20 ENCOUNTER — APPOINTMENT (OUTPATIENT)
Dept: CT IMAGING | Facility: HOSPITAL | Age: 72
End: 2022-04-20

## 2022-04-20 ENCOUNTER — OUTPATIENT (OUTPATIENT)
Dept: OUTPATIENT SERVICES | Facility: HOSPITAL | Age: 72
LOS: 1 days | End: 2022-04-20
Payer: MEDICARE

## 2022-04-20 DIAGNOSIS — R29.6 REPEATED FALLS: Chronic | ICD-10-CM

## 2022-04-20 DIAGNOSIS — S42.201A UNSPECIFIED FRACTURE OF UPPER END OF RIGHT HUMERUS, INITIAL ENCOUNTER FOR CLOSED FRACTURE: ICD-10-CM

## 2022-04-20 PROCEDURE — 73200 CT UPPER EXTREMITY W/O DYE: CPT | Mod: 26,RT,MH

## 2022-04-20 PROCEDURE — 73200 CT UPPER EXTREMITY W/O DYE: CPT

## 2022-04-20 PROCEDURE — 76376 3D RENDER W/INTRP POSTPROCES: CPT

## 2022-04-21 PROCEDURE — 76376 3D RENDER W/INTRP POSTPROCES: CPT | Mod: 26

## 2022-04-26 ENCOUNTER — APPOINTMENT (OUTPATIENT)
Dept: OPHTHALMOLOGY | Facility: CLINIC | Age: 72
End: 2022-04-26

## 2022-05-10 ENCOUNTER — TRANSCRIPTION ENCOUNTER (OUTPATIENT)
Age: 72
End: 2022-05-10

## 2022-05-10 ENCOUNTER — NON-APPOINTMENT (OUTPATIENT)
Age: 72
End: 2022-05-10

## 2022-05-10 ENCOUNTER — APPOINTMENT (OUTPATIENT)
Dept: OPHTHALMOLOGY | Facility: CLINIC | Age: 72
End: 2022-05-10

## 2022-05-10 ENCOUNTER — OUTPATIENT (OUTPATIENT)
Dept: OUTPATIENT SERVICES | Facility: HOSPITAL | Age: 72
LOS: 1 days | End: 2022-05-10
Payer: MEDICARE

## 2022-05-10 DIAGNOSIS — R29.6 REPEATED FALLS: Chronic | ICD-10-CM

## 2022-05-10 PROCEDURE — 65855 TRABECULOPLASTY LASER SURG: CPT | Mod: LT

## 2022-05-11 DIAGNOSIS — H40.1431 CAPSULAR GLAUCOMA WITH PSEUDOEXFOLIATION OF LENS, BILATERAL, MILD STAGE: ICD-10-CM

## 2022-05-25 RX ORDER — OFLOXACIN 0.3 %
1 DROPS OPHTHALMIC (EYE)
Refills: 0 | Status: DISCONTINUED | OUTPATIENT
Start: 2022-05-26 | End: 2022-05-26

## 2022-05-25 RX ORDER — TROPICAMIDE 1 %
1 DROPS OPHTHALMIC (EYE)
Refills: 0 | Status: DISCONTINUED | OUTPATIENT
Start: 2022-05-26 | End: 2022-05-26

## 2022-05-25 RX ORDER — PHENYLEPHRINE HCL 2.5 %
1 DROPS OPHTHALMIC (EYE)
Refills: 0 | Status: DISCONTINUED | OUTPATIENT
Start: 2022-05-26 | End: 2022-05-26

## 2022-05-25 RX ORDER — SODIUM CHLORIDE 9 MG/ML
1000 INJECTION, SOLUTION INTRAVENOUS
Refills: 0 | Status: DISCONTINUED | OUTPATIENT
Start: 2022-05-26 | End: 2022-05-26

## 2022-05-25 NOTE — ASU PATIENT PROFILE, ADULT - FALL HARM RISK - HARM RISK INTERVENTIONS

## 2022-05-26 ENCOUNTER — NON-APPOINTMENT (OUTPATIENT)
Age: 72
End: 2022-05-26

## 2022-05-26 ENCOUNTER — TRANSCRIPTION ENCOUNTER (OUTPATIENT)
Age: 72
End: 2022-05-26

## 2022-05-26 ENCOUNTER — APPOINTMENT (OUTPATIENT)
Dept: OPHTHALMOLOGY | Facility: AMBULATORY SURGERY CENTER | Age: 72
End: 2022-05-26

## 2022-05-26 ENCOUNTER — OUTPATIENT (OUTPATIENT)
Dept: OUTPATIENT SERVICES | Facility: HOSPITAL | Age: 72
LOS: 1 days | Discharge: HOME | End: 2022-05-26
Payer: MEDICARE

## 2022-05-26 VITALS
TEMPERATURE: 97 F | RESPIRATION RATE: 16 BRPM | SYSTOLIC BLOOD PRESSURE: 141 MMHG | HEART RATE: 87 BPM | DIASTOLIC BLOOD PRESSURE: 75 MMHG | OXYGEN SATURATION: 95 %

## 2022-05-26 VITALS
OXYGEN SATURATION: 95 % | HEART RATE: 88 BPM | DIASTOLIC BLOOD PRESSURE: 71 MMHG | TEMPERATURE: 98 F | SYSTOLIC BLOOD PRESSURE: 145 MMHG | RESPIRATION RATE: 16 BRPM

## 2022-05-26 DIAGNOSIS — R29.6 REPEATED FALLS: Chronic | ICD-10-CM

## 2022-05-26 LAB
GLUCOSE BLDC GLUCOMTR-MCNC: 123 MG/DL — HIGH (ref 70–99)
SARS-COV-2 RNA SPEC QL NAA+PROBE: NEGATIVE — SIGNIFICANT CHANGE UP

## 2022-05-26 PROCEDURE — 66982 XCAPSL CTRC RMVL CPLX WO ECP: CPT | Mod: LT

## 2022-05-26 DEVICE — EYEJET TYPE MR-14A LT
Type: IMPLANTABLE DEVICE | Site: LEFT | Status: NON-FUNCTIONAL
Removed: 2022-05-26

## 2022-05-26 DEVICE — LENS IOL TECNIS PROTEC ZCB00 22.5D
Type: IMPLANTABLE DEVICE | Site: LEFT | Status: NON-FUNCTIONAL
Removed: 2022-05-26

## 2022-05-26 RX ORDER — LOSARTAN/HYDROCHLOROTHIAZIDE 100MG-25MG
1 TABLET ORAL
Qty: 0 | Refills: 0 | DISCHARGE

## 2022-05-26 RX ORDER — HUMAN INSULIN 100 [IU]/ML
15 INJECTION, SUSPENSION SUBCUTANEOUS
Qty: 0 | Refills: 0 | DISCHARGE

## 2022-05-26 RX ORDER — HUMAN INSULIN 100 [IU]/ML
30 INJECTION, SUSPENSION SUBCUTANEOUS
Qty: 0 | Refills: 0 | DISCHARGE

## 2022-05-26 RX ORDER — ASPIRIN/CALCIUM CARB/MAGNESIUM 324 MG
1 TABLET ORAL
Qty: 0 | Refills: 0 | DISCHARGE

## 2022-05-26 RX ORDER — LABETALOL HCL 100 MG
1 TABLET ORAL
Qty: 0 | Refills: 0 | DISCHARGE

## 2022-05-26 RX ORDER — ACETAMINOPHEN 500 MG
650 TABLET ORAL ONCE
Refills: 0 | Status: DISCONTINUED | OUTPATIENT
Start: 2022-05-26 | End: 2022-05-26

## 2022-05-26 RX ORDER — TRAZODONE HCL 50 MG
50 TABLET ORAL
Qty: 0 | Refills: 0 | DISCHARGE

## 2022-05-26 RX ORDER — INSULIN HUMAN 100 [IU]/ML
0 INJECTION, SOLUTION SUBCUTANEOUS
Qty: 0 | Refills: 0 | DISCHARGE

## 2022-05-26 RX ORDER — AMLODIPINE BESYLATE 2.5 MG/1
1 TABLET ORAL
Qty: 0 | Refills: 0 | DISCHARGE

## 2022-05-26 RX ORDER — INSULIN GLARGINE 100 [IU]/ML
30 INJECTION, SOLUTION SUBCUTANEOUS
Qty: 0 | Refills: 0 | DISCHARGE

## 2022-05-26 RX ORDER — CYCLOPENTOLATE HYDROCHLORIDE 10 MG/ML
1 SOLUTION/ DROPS OPHTHALMIC
Refills: 0 | Status: DISCONTINUED | OUTPATIENT
Start: 2022-05-26 | End: 2022-05-26

## 2022-05-26 RX ADMIN — Medication 1 DROP(S): at 11:18

## 2022-05-26 RX ADMIN — CYCLOPENTOLATE HYDROCHLORIDE 1 DROP(S): 10 SOLUTION/ DROPS OPHTHALMIC at 11:18

## 2022-05-26 RX ADMIN — Medication 1 DROP(S): at 11:17

## 2022-05-26 RX ADMIN — Medication 1 DROP(S): at 11:19

## 2022-05-26 NOTE — OPERATIVE REPORT - OPERATIVE RPOSRT DETAILS
SURGEON: Janki Spencer DO    ASSISTANT: Leeanna Jones MD    PREOPERATIVE DIAGNOSIS: Cataract, astigmatism left eye    POSTOPERATIVE DIAGNOSIS: Cataract, astigmatism left eye    OPERATIVE PROCEDURE: Femtosecond laser assisted cataract extraction (FLACS) with posterior chamber intraocular lens, left eye    LENS MODEL/ POWER: CT Urvashi +22.0    ESTIMATED BLOOD LOSS: <1 cc    SPECIMEN/TISSUE REMOVED: None    COMPLICATIONS: None    DESCRIPTION OF PROCEDURE:    The Risks and benefits of cataract surgery were discussed with the patient, and informed consent was obtained. The correct operative site was identified and marked. The patient received dilating and antibiotic drops preoperatively. The patient was taken to the laser room and a drop of anesthetic was instilled in the eye and the eye was marked while upright. The femtosecond laser was used to perform select steps of the procedure. The patient was then taken to the operating room with monitored anesthesia care. Tetracaine drops were instilled in the eye. The eye was prepped in a sterile manner using Betadine solution. The patient was covered in sterile drapes leaving the eye exposed. The lashes were covered with tegaderm, and a Omar lid speculum was placed to provide exposure.     An MVR blade was used to create a paracentesis at the 5 o’clock position. The anterior chamber was irrigated with epishugarcaine. Viscoelastic was then injected to fill the anterior chamber. A keratome was used to create a triplanar incision through clear cornea just inside the temporal limbus. The Utrata forceps were then used to free the capsulorhexis 360 degrees and remove the anterior lens capsule from the eye. BSS was used to hydrodissect the nucleus. Phacoemulsification was then performed to remove the nucleus. Irrigation and aspiration was used to remove the residual cortical material. Viscoelastic was injected to fill the capsular bag and reform the anterior chamber.    A new foldable posterior chamber intraocular lens was brought onto the surgical field and loaded into the . It was injected into the capsular bag and rotated into position using a Sinsky hook. The intraocular lens was well centered and secure. Irrigation and aspiration was performed to remove the remaining viscoelastic. BSS on a 30-gauge cannula was used to hydrate the wounds. The wounds were checked and found to be watertight. The lid speculum and drapes were carefully removed. An antibiotic/ steroid ointment was instilled in the eye. The eye was covered with a clear shield. The patient tolerated the procedure well without complications, and was taken to the recovery area in a good condition.   SURGEON: Janki Spencer DO    ASSISTANT: Leeanna Jones MD    PREOPERATIVE DIAGNOSIS: Cataract, astigmatism left eye    POSTOPERATIVE DIAGNOSIS: Cataract, astigmatism left eye    OPERATIVE PROCEDURE: Phacoemulsification cataract extraction with posterior chamber intraocular lens, left  eye    LENS MODEL/ POWER: CT Urvashi +22.0    ESTIMATED BLOOD LOSS: <1 cc    SPECIMEN/TISSUE REMOVED: None    COMPLICATIONS: None    DESCRIPTION OF PROCEDURE:    The Risks and benefits of cataract surgery were discussed with the patient, and informed consent was obtained. The correct operative site was identified and marked. The patient received dilating and antibiotic drops preoperatively. The patient was taken to the operating room with monitored anesthesia care. Tetracaine drops were instilled in the eye. The eye was prepped in a sterile manner using Betadine solution. The patient was covered in sterile drapes leaving the eye exposed. The lashes were covered with tegaderm, and a Omar lid speculum was placed to provide exposure.     An MVR blade was used to create a paracentesis at the 5 o’clock position. The anterior chamber was irrigated with 1% preservative free lidocaine. Viscoelastic was then injected to fill the anterior chamber. A keratome was used to create a triplanar incision through clear cornea just inside the temporal limbus. The cystotome and Utrata forceps were then used to create a continuous, curvilinear capsulorhexis in the anterior lens capsule. BSS was used to hydrodissect the nucleus. Phacoemulsification was then performed to remove the nucleus. Irrigation and aspiration was used to remove the residual cortical material. Viscoelastic was injected to fill the capsular bag and reform the anterior chamber.    A new foldable posterior chamber intraocular lens was brought onto the surgical field and loaded into the . It was injected into the capsular bag and rotated into position using a Sinsky hook. The intraocular lens was well centered and secure. Irrigation and aspiration was performed to remove the remaining viscoelastic. BSS on a 30-gauge cannula was used to hydrate the wounds. The wounds were checked and found to be watertight. The lid speculum and drapes were carefully removed. An antibiotic/ steroid ointment was instilled in the eye. The eye was covered with a clear shield. The patient tolerated the procedure well without complications, and was taken to the recovery area in a good condition.   SURGEON: Janki Spencer DO    ASSISTANT: Leeanna Jones MD    PREOPERATIVE DIAGNOSIS: Cataract, pseudoexfoliation left eye    POSTOPERATIVE DIAGNOSIS: Cataract, pseudoexfoliation left eye    OPERATIVE PROCEDURE: Synechialysis with phacoemulsification cataract extraction with posterior chamber intraocular lens with iris hooks and capsular tension ring, left  eye    LENS MODEL/ POWER: ZCB00 +22.5, CTR 14A    ESTIMATED BLOOD LOSS: <1 cc    SPECIMEN/TISSUE REMOVED: None    COMPLICATIONS: None    DESCRIPTION OF PROCEDURE:    The Risks and benefits of cataract surgery were discussed with the patient, and informed consent was obtained. The correct operative site was identified and marked. The patient received dilating and antibiotic drops preoperatively. The patient was taken to the operating room with monitored anesthesia care. Tetracaine drops were instilled in the eye. The eye was prepped in a sterile manner using Betadine solution. The patient was covered in sterile drapes leaving the eye exposed. The lashes were covered with tegaderm, and a Omar lid speculum was placed to provide exposure.     An MVR blade was used to create a paracentesis at the 5 o’clock position. The anterior chamber was irrigated with 1% preservative free lidocaine. Next epinephrine was instilled into the anterior chamber and the canula was used to perform synechialysis. Air, followed by trypan blue, followed by balanced salt solution was then instilled. Viscoelastic was then injected to fill the anterior chamber. 5 additional parancentesis sites were created and iris hooks were placed due to poor pupillary dilation. A keratome was used to create a triplanar incision through clear cornea just inside the temporal limbus. The cystotome and Utrata forceps were then used to create a continuous, curvilinear capsulorhexis in the anterior lens capsule. BSS was used to hydrodissect the nucleus. Phacoemulsification was then performed to remove the nucleus. Irrigation and aspiration was used to remove the residual cortical material. Viscoelastic was injected to fill the capsular bag and reform the anterior chamber. Given the patients history of pseudoexfoliation the decision was made to insert a capsular tension ring.    A new foldable posterior chamber intraocular lens was brought onto the surgical field and loaded into the . It was injected into the capsular bag and rotated into position using a Sinsky hook. The intraocular lens was well centered and secure. All iris hooks were removed. Irrigation and aspiration was performed to remove the remaining viscoelastic. BSS on a 30-gauge cannula was used to hydrate the wounds. The wounds were checked and found to be watertight. The lid speculum and drapes were carefully removed. An antibiotic/ steroid ointment was instilled in the eye. The eye was covered with a clear shield. The patient tolerated the procedure well without complications, and was taken to the recovery area in a good condition.   SURGEON: Janki Spencer DO    ASSISTANT: Leeanna Jones MD    PREOPERATIVE DIAGNOSIS: Cataract, pseudoexfoliation left eye    POSTOPERATIVE DIAGNOSIS: Cataract, pseudoexfoliation left eye    OPERATIVE PROCEDURE: Synechialysis with complex phacoemulsification cataract extraction with posterior chamber intraocular lens with iris hooks and capsular tension ring, left  eye    LENS MODEL/ POWER: ZCB00 +22.5, CTR 14A    ESTIMATED BLOOD LOSS: <1 cc    SPECIMEN/TISSUE REMOVED: None    COMPLICATIONS: None    DESCRIPTION OF PROCEDURE:    The Risks and benefits of cataract surgery were discussed with the patient, and informed consent was obtained. The correct operative site was identified and marked. The patient received dilating and antibiotic drops preoperatively. The patient was taken to the operating room with monitored anesthesia care. Tetracaine drops were instilled in the eye. The eye was prepped in a sterile manner using Betadine solution. The patient was covered in sterile drapes leaving the eye exposed. The lashes were covered with tegaderm, and a Omar lid speculum was placed to provide exposure.     An MVR blade was used to create a paracentesis at the 5 o’clock position. The anterior chamber was irrigated with 1% preservative free lidocaine. Next epinephrine was instilled into the anterior chamber and the canula was used to perform synechialysis. Air, followed by trypan blue, followed by balanced salt solution was then instilled. Viscoelastic was then injected to fill the anterior chamber. 5 additional parancentesis sites were created and iris hooks were placed due to poor pupillary dilation. A keratome was used to create a triplanar incision through clear cornea just inside the temporal limbus. The cystotome and Utrata forceps were then used to create a continuous, curvilinear capsulorhexis in the anterior lens capsule. BSS was used to hydrodissect the nucleus. Phacoemulsification was then performed to remove the nucleus. Irrigation and aspiration was used to remove the residual cortical material. Viscoelastic was injected to fill the capsular bag and reform the anterior chamber. Given the patients history of pseudoexfoliation the decision was made to insert a capsular tension ring.    A new foldable posterior chamber intraocular lens was brought onto the surgical field and loaded into the . It was injected into the capsular bag and rotated into position using a Sinsky hook. The intraocular lens was well centered and secure. All iris hooks were removed. Irrigation and aspiration was performed to remove the remaining viscoelastic. BSS on a 30-gauge cannula was used to hydrate the wounds. The wounds were checked and found to be watertight. The lid speculum and drapes were carefully removed. An antibiotic/ steroid ointment was instilled in the eye. The eye was covered with a clear shield. The patient tolerated the procedure well without complications, and was taken to the recovery area in a good condition.

## 2022-05-26 NOTE — ASU DISCHARGE PLAN (ADULT/PEDIATRIC) - NS MD DC FALL RISK RISK
For information on Fall & Injury Prevention, visit: [email or web address] OR  [email or web address] OR  https://www.cdc.gov/steadi/patient.html

## 2022-05-27 ENCOUNTER — NON-APPOINTMENT (OUTPATIENT)
Age: 72
End: 2022-05-27

## 2022-05-27 ENCOUNTER — APPOINTMENT (OUTPATIENT)
Dept: OPHTHALMOLOGY | Facility: CLINIC | Age: 72
End: 2022-05-27

## 2022-05-27 ENCOUNTER — APPOINTMENT (OUTPATIENT)
Dept: OPHTHALMOLOGY | Facility: CLINIC | Age: 72
End: 2022-05-27
Payer: MEDICAID

## 2022-05-27 PROCEDURE — 99024 POSTOP FOLLOW-UP VISIT: CPT

## 2022-06-03 ENCOUNTER — APPOINTMENT (OUTPATIENT)
Dept: OPHTHALMOLOGY | Facility: CLINIC | Age: 72
End: 2022-06-03
Payer: MEDICAID

## 2022-06-03 ENCOUNTER — NON-APPOINTMENT (OUTPATIENT)
Age: 72
End: 2022-06-03

## 2022-06-03 ENCOUNTER — APPOINTMENT (OUTPATIENT)
Dept: OPHTHALMOLOGY | Facility: CLINIC | Age: 72
End: 2022-06-03

## 2022-06-03 PROBLEM — Z86.73 PERSONAL HISTORY OF TRANSIENT ISCHEMIC ATTACK (TIA), AND CEREBRAL INFARCTION WITHOUT RESIDUAL DEFICITS: Chronic | Status: ACTIVE | Noted: 2022-05-26

## 2022-06-03 PROBLEM — I25.10 ATHEROSCLEROTIC HEART DISEASE OF NATIVE CORONARY ARTERY WITHOUT ANGINA PECTORIS: Chronic | Status: ACTIVE | Noted: 2022-05-26

## 2022-06-03 PROBLEM — I10 ESSENTIAL (PRIMARY) HYPERTENSION: Chronic | Status: ACTIVE | Noted: 2022-05-26

## 2022-06-03 PROBLEM — E11.9 TYPE 2 DIABETES MELLITUS WITHOUT COMPLICATIONS: Chronic | Status: ACTIVE | Noted: 2022-05-26

## 2022-06-03 PROCEDURE — 99024 POSTOP FOLLOW-UP VISIT: CPT

## 2022-06-14 ENCOUNTER — NON-APPOINTMENT (OUTPATIENT)
Age: 72
End: 2022-06-14

## 2022-06-14 ENCOUNTER — OUTPATIENT (OUTPATIENT)
Dept: OUTPATIENT SERVICES | Facility: HOSPITAL | Age: 72
LOS: 1 days | End: 2022-06-14

## 2022-06-14 ENCOUNTER — APPOINTMENT (OUTPATIENT)
Dept: OPHTHALMOLOGY | Facility: CLINIC | Age: 72
End: 2022-06-14

## 2022-06-14 ENCOUNTER — APPOINTMENT (OUTPATIENT)
Dept: OPHTHALMOLOGY | Facility: CLINIC | Age: 72
End: 2022-06-14
Payer: MEDICARE

## 2022-06-14 DIAGNOSIS — R29.6 REPEATED FALLS: Chronic | ICD-10-CM

## 2022-06-14 PROCEDURE — 65855 TRABECULOPLASTY LASER SURG: CPT | Mod: 79,RT

## 2022-06-15 DIAGNOSIS — H40.1431 CAPSULAR GLAUCOMA WITH PSEUDOEXFOLIATION OF LENS, BILATERAL, MILD STAGE: ICD-10-CM

## 2022-07-01 ENCOUNTER — APPOINTMENT (OUTPATIENT)
Dept: OPHTHALMOLOGY | Facility: CLINIC | Age: 72
End: 2022-07-01

## 2022-07-01 ENCOUNTER — NON-APPOINTMENT (OUTPATIENT)
Age: 72
End: 2022-07-01

## 2022-07-01 PROCEDURE — 99024 POSTOP FOLLOW-UP VISIT: CPT

## 2022-08-08 ENCOUNTER — NON-APPOINTMENT (OUTPATIENT)
Age: 72
End: 2022-08-08

## 2022-08-08 ENCOUNTER — APPOINTMENT (OUTPATIENT)
Dept: OPHTHALMOLOGY | Facility: CLINIC | Age: 72
End: 2022-08-08

## 2022-08-08 PROCEDURE — 99024 POSTOP FOLLOW-UP VISIT: CPT

## 2022-09-13 ENCOUNTER — APPOINTMENT (OUTPATIENT)
Dept: OPHTHALMOLOGY | Facility: CLINIC | Age: 72
End: 2022-09-13

## 2022-09-13 ENCOUNTER — NON-APPOINTMENT (OUTPATIENT)
Age: 72
End: 2022-09-13

## 2022-09-13 PROCEDURE — 92134 CPTRZ OPH DX IMG PST SGM RTA: CPT

## 2022-09-13 PROCEDURE — 92014 COMPRE OPH EXAM EST PT 1/>: CPT

## 2022-10-23 ENCOUNTER — EMERGENCY (EMERGENCY)
Facility: HOSPITAL | Age: 72
LOS: 1 days | Discharge: HOME | End: 2022-10-23
Attending: EMERGENCY MEDICINE
Payer: MEDICARE

## 2022-10-23 VITALS
WEIGHT: 164.91 LBS | HEIGHT: 62 IN | DIASTOLIC BLOOD PRESSURE: 82 MMHG | RESPIRATION RATE: 16 BRPM | SYSTOLIC BLOOD PRESSURE: 130 MMHG | HEART RATE: 94 BPM | OXYGEN SATURATION: 96 % | TEMPERATURE: 98 F

## 2022-10-23 DIAGNOSIS — R29.6 REPEATED FALLS: Chronic | ICD-10-CM

## 2022-10-23 LAB
ALBUMIN SERPL ELPH-MCNC: 3.8 G/DL — SIGNIFICANT CHANGE UP (ref 3.5–5)
ALP SERPL-CCNC: 76 U/L — SIGNIFICANT CHANGE UP (ref 40–120)
ALT FLD-CCNC: 33 U/L DA — SIGNIFICANT CHANGE UP (ref 10–60)
ANION GAP SERPL CALC-SCNC: 8 MMOL/L — SIGNIFICANT CHANGE UP (ref 5–17)
APPEARANCE UR: CLEAR — SIGNIFICANT CHANGE UP
AST SERPL-CCNC: 20 U/L — SIGNIFICANT CHANGE UP (ref 10–40)
BACTERIA # UR AUTO: ABNORMAL /HPF
BASOPHILS # BLD AUTO: 0.05 K/UL — SIGNIFICANT CHANGE UP (ref 0–0.2)
BASOPHILS NFR BLD AUTO: 0.5 % — SIGNIFICANT CHANGE UP (ref 0–2)
BILIRUB SERPL-MCNC: 0.4 MG/DL — SIGNIFICANT CHANGE UP (ref 0.2–1.2)
BILIRUB UR-MCNC: NEGATIVE — SIGNIFICANT CHANGE UP
BUN SERPL-MCNC: 36 MG/DL — HIGH (ref 7–18)
CALCIUM SERPL-MCNC: 9.6 MG/DL — SIGNIFICANT CHANGE UP (ref 8.4–10.5)
CHLORIDE SERPL-SCNC: 103 MMOL/L — SIGNIFICANT CHANGE UP (ref 96–108)
CO2 SERPL-SCNC: 26 MMOL/L — SIGNIFICANT CHANGE UP (ref 22–31)
COLOR SPEC: YELLOW — SIGNIFICANT CHANGE UP
CREAT SERPL-MCNC: 1.21 MG/DL — SIGNIFICANT CHANGE UP (ref 0.5–1.3)
DIFF PNL FLD: NEGATIVE — SIGNIFICANT CHANGE UP
EGFR: 47 ML/MIN/1.73M2 — LOW
EOSINOPHIL # BLD AUTO: 0.19 K/UL — SIGNIFICANT CHANGE UP (ref 0–0.5)
EOSINOPHIL NFR BLD AUTO: 1.9 % — SIGNIFICANT CHANGE UP (ref 0–6)
EPI CELLS # UR: SIGNIFICANT CHANGE UP /HPF
GLUCOSE SERPL-MCNC: 134 MG/DL — HIGH (ref 70–99)
GLUCOSE UR QL: 1000 MG/DL
HCT VFR BLD CALC: 42.4 % — SIGNIFICANT CHANGE UP (ref 34.5–45)
HGB BLD-MCNC: 14.3 G/DL — SIGNIFICANT CHANGE UP (ref 11.5–15.5)
IMM GRANULOCYTES NFR BLD AUTO: 0.3 % — SIGNIFICANT CHANGE UP (ref 0–0.9)
KETONES UR-MCNC: NEGATIVE — SIGNIFICANT CHANGE UP
LEUKOCYTE ESTERASE UR-ACNC: ABNORMAL
LYMPHOCYTES # BLD AUTO: 1.92 K/UL — SIGNIFICANT CHANGE UP (ref 1–3.3)
LYMPHOCYTES # BLD AUTO: 18.8 % — SIGNIFICANT CHANGE UP (ref 13–44)
MCHC RBC-ENTMCNC: 30.3 PG — SIGNIFICANT CHANGE UP (ref 27–34)
MCHC RBC-ENTMCNC: 33.7 GM/DL — SIGNIFICANT CHANGE UP (ref 32–36)
MCV RBC AUTO: 89.8 FL — SIGNIFICANT CHANGE UP (ref 80–100)
MONOCYTES # BLD AUTO: 0.56 K/UL — SIGNIFICANT CHANGE UP (ref 0–0.9)
MONOCYTES NFR BLD AUTO: 5.5 % — SIGNIFICANT CHANGE UP (ref 2–14)
NEUTROPHILS # BLD AUTO: 7.47 K/UL — HIGH (ref 1.8–7.4)
NEUTROPHILS NFR BLD AUTO: 73 % — SIGNIFICANT CHANGE UP (ref 43–77)
NITRITE UR-MCNC: NEGATIVE — SIGNIFICANT CHANGE UP
NRBC # BLD: 0 /100 WBCS — SIGNIFICANT CHANGE UP (ref 0–0)
PH UR: 5 — SIGNIFICANT CHANGE UP (ref 5–8)
PLATELET # BLD AUTO: 246 K/UL — SIGNIFICANT CHANGE UP (ref 150–400)
POTASSIUM SERPL-MCNC: 4 MMOL/L — SIGNIFICANT CHANGE UP (ref 3.5–5.3)
POTASSIUM SERPL-SCNC: 4 MMOL/L — SIGNIFICANT CHANGE UP (ref 3.5–5.3)
PROT SERPL-MCNC: 7.8 G/DL — SIGNIFICANT CHANGE UP (ref 6–8.3)
PROT UR-MCNC: 15
RBC # BLD: 4.72 M/UL — SIGNIFICANT CHANGE UP (ref 3.8–5.2)
RBC # FLD: 14.1 % — SIGNIFICANT CHANGE UP (ref 10.3–14.5)
RBC CASTS # UR COMP ASSIST: SIGNIFICANT CHANGE UP /HPF (ref 0–2)
SODIUM SERPL-SCNC: 137 MMOL/L — SIGNIFICANT CHANGE UP (ref 135–145)
SP GR SPEC: 1.02 — SIGNIFICANT CHANGE UP (ref 1.01–1.02)
UROBILINOGEN FLD QL: NEGATIVE — SIGNIFICANT CHANGE UP
WBC # BLD: 10.22 K/UL — SIGNIFICANT CHANGE UP (ref 3.8–10.5)
WBC # FLD AUTO: 10.22 K/UL — SIGNIFICANT CHANGE UP (ref 3.8–10.5)
WBC UR QL: ABNORMAL /HPF (ref 0–5)

## 2022-10-23 PROCEDURE — 85025 COMPLETE CBC W/AUTO DIFF WBC: CPT

## 2022-10-23 PROCEDURE — 82962 GLUCOSE BLOOD TEST: CPT

## 2022-10-23 PROCEDURE — 36415 COLL VENOUS BLD VENIPUNCTURE: CPT

## 2022-10-23 PROCEDURE — 87086 URINE CULTURE/COLONY COUNT: CPT

## 2022-10-23 PROCEDURE — 99284 EMERGENCY DEPT VISIT MOD MDM: CPT

## 2022-10-23 PROCEDURE — 99284 EMERGENCY DEPT VISIT MOD MDM: CPT | Mod: 25

## 2022-10-23 PROCEDURE — 81001 URINALYSIS AUTO W/SCOPE: CPT

## 2022-10-23 PROCEDURE — 80053 COMPREHEN METABOLIC PANEL: CPT

## 2022-10-23 PROCEDURE — 96374 THER/PROPH/DIAG INJ IV PUSH: CPT

## 2022-10-23 RX ORDER — CEFUROXIME AXETIL 250 MG
1 TABLET ORAL
Qty: 10 | Refills: 0
Start: 2022-10-23 | End: 2022-10-27

## 2022-10-23 RX ORDER — CEFTRIAXONE 500 MG/1
1000 INJECTION, POWDER, FOR SOLUTION INTRAMUSCULAR; INTRAVENOUS ONCE
Refills: 0 | Status: COMPLETED | OUTPATIENT
Start: 2022-10-23 | End: 2022-10-23

## 2022-10-23 RX ADMIN — CEFTRIAXONE 100 MILLIGRAM(S): 500 INJECTION, POWDER, FOR SOLUTION INTRAMUSCULAR; INTRAVENOUS at 22:40

## 2022-10-23 NOTE — ED PROVIDER NOTE - CLINICAL SUMMARY MEDICAL DECISION MAKING FREE TEXT BOX
70 y/o female, ambulates independently, from [facility], w/ PMHx of Alzheimer's' dementia, hx of CVA, HTN, DM, MDD, and recurrent falls presents to ed for hypoglycemia. ems states fs 39. Oj given and now at baseline. pt states no complains. states she gets insulin 2x/day and received today. pt ate 1 hr ago    hypoglycemia possibly due to insulin  r/o uti. labs, ua.

## 2022-10-23 NOTE — ED ADULT TRIAGE NOTE - CHIEF COMPLAINT QUOTE
as per EMS " The said  blood sugar was low "39"" they gave orange juice and when we came it's already going up and she's awake and alert "

## 2022-10-23 NOTE — ED PROVIDER NOTE - PATIENT PORTAL LINK FT
You can access the FollowMyHealth Patient Portal offered by [facility] by registering at the following website: [email or web address]. By joining [employer]’s FollowMyHealth portal, you will also be able to view your health information using other applications (apps) compatible with our system.

## 2022-10-23 NOTE — ED PROVIDER NOTE - PROGRESS NOTE DETAILS
ramirez: fs stable. ua possibly uti will start on cefuroxime 500mg BID for 5 days. ok to return to assisted living.

## 2022-10-23 NOTE — ED PROVIDER NOTE - OBJECTIVE STATEMENT
70 y/o female, ambulates independently, from [facility], w/ PMHx of Alzheimer's' dementia, hx of CVA, HTN, DM, MDD, and recurrent falls presents to ed for hypoglycemia. ems states fs 39. Oj given and now at baseline. pt states no complains. states she gets insulin 2x/day and received today. pt ate 1 hr ago

## 2022-10-24 VITALS
TEMPERATURE: 98 F | HEART RATE: 100 BPM | OXYGEN SATURATION: 98 % | DIASTOLIC BLOOD PRESSURE: 93 MMHG | RESPIRATION RATE: 17 BRPM | SYSTOLIC BLOOD PRESSURE: 167 MMHG

## 2022-10-25 LAB
CULTURE RESULTS: SIGNIFICANT CHANGE UP
SPECIMEN SOURCE: SIGNIFICANT CHANGE UP

## 2022-11-07 ENCOUNTER — NON-APPOINTMENT (OUTPATIENT)
Age: 72
End: 2022-11-07

## 2022-11-07 ENCOUNTER — APPOINTMENT (OUTPATIENT)
Dept: OPHTHALMOLOGY | Facility: CLINIC | Age: 72
End: 2022-11-07

## 2022-11-07 PROCEDURE — 92012 INTRM OPH EXAM EST PATIENT: CPT

## 2022-11-07 PROCEDURE — 92133 CPTRZD OPH DX IMG PST SGM ON: CPT

## 2022-11-27 NOTE — DIETITIAN INITIAL EVALUATION ADULT. - ORAL INTAKE PTA/DIET HISTORY
Class II - visualization of the soft palate, fauces, and uvula NH diet order - No concentrated Sweets, regular consistency with thin liquids

## 2023-02-10 RX ORDER — LOSARTAN POTASSIUM 100 MG/1
1 TABLET, FILM COATED ORAL
Qty: 0 | Refills: 0 | DISCHARGE

## 2023-02-10 RX ORDER — INSULIN LISPRO 100/ML
0 VIAL (ML) SUBCUTANEOUS
Qty: 0 | Refills: 0 | DISCHARGE

## 2023-02-10 RX ORDER — METOPROLOL TARTRATE 50 MG
1 TABLET ORAL
Qty: 0 | Refills: 0 | DISCHARGE

## 2023-02-10 RX ORDER — ASPIRIN/CALCIUM CARB/MAGNESIUM 324 MG
1 TABLET ORAL
Qty: 0 | Refills: 0 | DISCHARGE

## 2023-02-10 RX ORDER — MEMANTINE HYDROCHLORIDE 10 MG/1
1 TABLET ORAL
Qty: 0 | Refills: 0 | DISCHARGE

## 2023-02-10 RX ORDER — TRAZODONE HCL 50 MG
1 TABLET ORAL
Qty: 0 | Refills: 0 | DISCHARGE

## 2023-02-10 RX ORDER — ASCORBIC ACID 60 MG
5 TABLET,CHEWABLE ORAL
Qty: 0 | Refills: 0 | DISCHARGE

## 2023-02-10 RX ORDER — PREDNISOLONE SODIUM PHOSPHATE 1 %
1 DROPS OPHTHALMIC (EYE)
Qty: 0 | Refills: 0 | DISCHARGE

## 2023-02-10 RX ORDER — DORZOLAMIDE HYDROCHLORIDE TIMOLOL MALEATE 20; 5 MG/ML; MG/ML
1 SOLUTION/ DROPS OPHTHALMIC
Qty: 0 | Refills: 0 | DISCHARGE

## 2023-02-10 RX ORDER — INSULIN GLARGINE 100 [IU]/ML
30 INJECTION, SOLUTION SUBCUTANEOUS
Qty: 0 | Refills: 0 | DISCHARGE

## 2023-02-10 RX ORDER — INSULIN LISPRO 100/ML
10 VIAL (ML) SUBCUTANEOUS
Qty: 0 | Refills: 0 | DISCHARGE

## 2023-02-13 ENCOUNTER — TRANSCRIPTION ENCOUNTER (OUTPATIENT)
Age: 73
End: 2023-02-13

## 2023-03-06 ENCOUNTER — APPOINTMENT (OUTPATIENT)
Dept: OPHTHALMOLOGY | Facility: CLINIC | Age: 73
End: 2023-03-06
Payer: MEDICARE

## 2023-03-06 ENCOUNTER — NON-APPOINTMENT (OUTPATIENT)
Age: 73
End: 2023-03-06

## 2023-03-06 PROBLEM — E11.9 TYPE 2 DIABETES MELLITUS WITHOUT COMPLICATIONS: Chronic | Status: ACTIVE | Noted: 2021-12-28

## 2023-03-06 PROBLEM — I63.9 CEREBRAL INFARCTION, UNSPECIFIED: Chronic | Status: ACTIVE | Noted: 2021-12-28

## 2023-03-06 PROBLEM — I10 ESSENTIAL (PRIMARY) HYPERTENSION: Chronic | Status: ACTIVE | Noted: 2021-12-28

## 2023-03-06 PROBLEM — G30.9 ALZHEIMER'S DISEASE, UNSPECIFIED: Chronic | Status: ACTIVE | Noted: 2021-12-28

## 2023-03-06 PROBLEM — F03.90 UNSPECIFIED DEMENTIA, UNSPECIFIED SEVERITY, WITHOUT BEHAVIORAL DISTURBANCE, PSYCHOTIC DISTURBANCE, MOOD DISTURBANCE, AND ANXIETY: Chronic | Status: ACTIVE | Noted: 2021-12-28

## 2023-03-06 PROBLEM — F32.9 MAJOR DEPRESSIVE DISORDER, SINGLE EPISODE, UNSPECIFIED: Chronic | Status: ACTIVE | Noted: 2021-12-28

## 2023-03-06 PROCEDURE — 92250 FUNDUS PHOTOGRAPHY W/I&R: CPT

## 2023-03-06 PROCEDURE — 92014 COMPRE OPH EXAM EST PT 1/>: CPT

## 2023-04-17 NOTE — ED ADULT TRIAGE NOTE - ACCOMPANIED BY
Gastrointestinal Esophagogastroduodenoscopy (EGD) - Upper Exam Discharge Instructions    1. Call Dr. Melissa Stauffer at [phone] for any problems or questions. 2. Contact the doctor's office for follow up appointment as directed. 3. Medication may cause drowsiness for several hours, therefore:  Do not drive or operate machinery for remainder of the day. No alcohol today. Do not make any important or legal decisions for 24 hours. Do not sign any legal documents for 24 hours. 5. Ordinarily, you may resume regular diet and activity after exam unless otherwise specified by your physician. 6. For mild soreness in your throat you may use Cepacol throat lozenges or warm salt-water gargles as needed. Gastrointestinal Colonoscopy/Flexible Sigmoidoscopy - Lower Exam Discharge Instructions    Medication may cause drowsiness for several hours, therefore, do not drive or operate machinery for remainder of the day. No alcohol today. Ordinarily, you may resume regular diet and activity after exam unless otherwise specified by your physician. Because of air put into your colon during exam, you may experience some abdominal distension, relieved by the passage of gas, for several hours. Contact your physician if you have any of the following:  Excessive amount of bleeding - large amount when having a bowel movement. Occasional specks of blood with bowel movement would not be unusual.  Severe abdominal pain  Fever or Chills    Any additional instructions:    Call Dr Melissa Stauffer office to get pathology results. EMT/paramedic

## 2023-09-19 ENCOUNTER — APPOINTMENT (OUTPATIENT)
Dept: OPHTHALMOLOGY | Facility: CLINIC | Age: 73
End: 2023-09-19

## 2023-10-16 ENCOUNTER — APPOINTMENT (OUTPATIENT)
Dept: OPHTHALMOLOGY | Facility: CLINIC | Age: 73
End: 2023-10-16

## (undated) DEVICE — VENODYNE/SCD SLEEVE CALF MEDIUM

## (undated) DEVICE — GLV 6.5 PROTEXIS (WHITE)

## (undated) DEVICE — SUT NYLON 10-0 12" CU-5

## (undated) DEVICE — NDL RETROBULBAR VISITEC 25X1.5

## (undated) DEVICE — KNIFE ALCON PARACENTESIS CLEARCUT SIDEPORT 1MM (YELLOW)

## (undated) DEVICE — CANNULA IRR ANT CHAMBER 30G

## (undated) DEVICE — WARMING BLANKET LOWER ADULT

## (undated) DEVICE — KIT CENTURION ANTERIOR

## (undated) DEVICE — PACK CENTURION 2.4MM

## (undated) DEVICE — CATARACT KIT

## (undated) DEVICE — PACK ANTERIOR SEGMENT

## (undated) DEVICE — DRSG TAPE MICROPORE SURGICAL TAPE .5"X10 YDS TAN

## (undated) DEVICE — CAPSULE GUARD I/A

## (undated) DEVICE — DRAPE MICROSCOPE KNOB COVER SMALL (2 PCS)

## (undated) DEVICE — Device

## (undated) DEVICE — CAPSULE RETRACTOR MST

## (undated) DEVICE — NDL HYPO SAFE 25G X 5/8" (ORANGE)

## (undated) DEVICE — SOL IRR BAL SALT 500ML